# Patient Record
Sex: FEMALE | Race: WHITE | NOT HISPANIC OR LATINO | Employment: OTHER | URBAN - METROPOLITAN AREA
[De-identification: names, ages, dates, MRNs, and addresses within clinical notes are randomized per-mention and may not be internally consistent; named-entity substitution may affect disease eponyms.]

---

## 2023-11-24 ENCOUNTER — APPOINTMENT (OUTPATIENT)
Dept: RADIOLOGY | Facility: IMAGING CENTER | Age: 73
End: 2023-11-24
Attending: PHYSICIAN ASSISTANT
Payer: COMMERCIAL

## 2023-11-24 ENCOUNTER — OFFICE VISIT (OUTPATIENT)
Dept: URGENT CARE | Facility: CLINIC | Age: 73
End: 2023-11-24

## 2023-11-24 VITALS
BODY MASS INDEX: 41.54 KG/M2 | WEIGHT: 258.5 LBS | DIASTOLIC BLOOD PRESSURE: 80 MMHG | SYSTOLIC BLOOD PRESSURE: 128 MMHG | HEART RATE: 79 BPM | TEMPERATURE: 96.9 F | HEIGHT: 66 IN | OXYGEN SATURATION: 100 % | RESPIRATION RATE: 14 BRPM

## 2023-11-24 DIAGNOSIS — M79.644 PAIN OF FINGER OF RIGHT HAND: ICD-10-CM

## 2023-11-24 DIAGNOSIS — M19.041 PRIMARY OSTEOARTHRITIS OF RIGHT HAND: ICD-10-CM

## 2023-11-24 PROCEDURE — 3074F SYST BP LT 130 MM HG: CPT | Performed by: PHYSICIAN ASSISTANT

## 2023-11-24 PROCEDURE — 3079F DIAST BP 80-89 MM HG: CPT | Performed by: PHYSICIAN ASSISTANT

## 2023-11-24 PROCEDURE — 99203 OFFICE O/P NEW LOW 30 MIN: CPT | Performed by: PHYSICIAN ASSISTANT

## 2023-11-24 PROCEDURE — 73140 X-RAY EXAM OF FINGER(S): CPT | Mod: TC,RT | Performed by: PHYSICIAN ASSISTANT

## 2023-11-24 RX ORDER — GABAPENTIN 100 MG/1
100 CAPSULE ORAL
COMMUNITY
End: 2023-12-12

## 2023-11-24 RX ORDER — ROPINIROLE 1 MG/1
1 TABLET, FILM COATED ORAL 3 TIMES DAILY
COMMUNITY
End: 2023-12-12

## 2023-11-24 RX ORDER — ROSUVASTATIN CALCIUM 10 MG/1
10 TABLET, COATED ORAL EVERY EVENING
COMMUNITY
End: 2024-01-15 | Stop reason: SDUPTHER

## 2023-11-24 RX ORDER — FLUOXETINE 10 MG/1
10 CAPSULE ORAL DAILY
COMMUNITY

## 2023-11-24 RX ORDER — OMEPRAZOLE 20 MG/1
20 CAPSULE, DELAYED RELEASE ORAL DAILY
COMMUNITY
End: 2024-01-15 | Stop reason: SDUPTHER

## 2023-11-24 RX ORDER — LEVOTHYROXINE SODIUM 88 UG/1
88 TABLET ORAL
COMMUNITY
End: 2024-01-15 | Stop reason: SDUPTHER

## 2023-11-24 NOTE — PROGRESS NOTES
"Subjective:   Lisa Ortiz is a 73 y.o. female who presents for Hand Injury (X 10 days, right hand 5th finger injury, possible broken not improving )  Is a very pleasant 73-year-old female who presents with chief complaint of right pinky pain after an injury involving her [place].  Her hand got stuck in his collar when he yanked.  She has noted persistent throbbing and decreased range of motion prompting evaluation today.  She reports she tried splinting her finger with an emery board.        Medications:  FLUoxetine Caps  gabapentin Caps  levothyroxine Tabs  LORazepam 0.2 mg/mL  omeprazole  ROPINIRole Tabs  rosuvastatin Tabs    Allergies:             Patient has no allergy information on record.    Surgical History:       No past surgical history on file.    Past Social Hx:  Lisa Ortiz  reports that she has never smoked. She has never used smokeless tobacco. She reports that she does not currently use alcohol. She reports that she does not use drugs.     Past Family Hx:   Lisa Ortiz family history is not on file.       Problem list, medications, and allergies reviewed by myself today in Epic.     Objective:     /80   Pulse 79   Temp 36.1 °C (96.9 °F) (Temporal)   Resp 14   Ht 1.676 m (5' 6\")   Wt 117 kg (258 lb 8 oz)   SpO2 100%   BMI 41.72 kg/m²     Physical Exam  Vitals and nursing note reviewed.   Constitutional:       General: She is not in acute distress.     Appearance: She is well-developed. She is not ill-appearing, toxic-appearing or diaphoretic.   HENT:      Head: Normocephalic.   Eyes:      Pupils: Pupils are equal, round, and reactive to light.   Cardiovascular:      Rate and Rhythm: Normal rate.   Pulmonary:      Effort: Pulmonary effort is normal.   Musculoskeletal:         General: Swelling and tenderness present.      Comments: Osteoarthritic changes are noted to the hands bilaterally.  There is trace tenderness at the PIP and DIP.  There is slight deformity noted.  " Decreased flexion at the DIP is noted.  Distal neurovascular intact.  No significant soft tissue swelling or bruising.   Skin:     General: Skin is warm and dry.      Findings: No erythema or rash.   Neurological:      Mental Status: She is alert and oriented to person, place, and time.   Psychiatric:         Behavior: Behavior is cooperative.         RADIOLOGY RESULTS   DX-FINGER(S) 2+ RIGHT    Result Date: 11/24/2023 11/24/2023 12:27 PM HISTORY/REASON FOR EXAM:  Pain/Deformity Following Trauma; 5th digit. . TECHNIQUE/EXAM DESCRIPTION AND NUMBER OF VIEWS:  3 views of the RIGHT fingers. COMPARISON: None FINDINGS: There is no fracture or dislocation. The visualized osseous structures are in anatomic alignment. There are degenerative changes throughout the interphalangeal joints with joint space narrowing and marginal osteophytes. Bone mineralization is decreased..     1.  No acute osseous abnormality. 2.  Advanced osteoarthritis of the interphalangeal joints.           Assessment/Plan:     Diagnosis and Associated Orders:     1. Pain of finger of right hand  - DX-FINGER(S) 2+ RIGHT  - Referral to Orthopedics    2. Primary osteoarthritis of right hand  - Referral to Orthopedics    Other orders  - LORazepam 0.2 mg/mL (ATIVAN); Infuse 0.5 mg into a venous catheter every 3 hours as needed.  - FLUoxetine (PROZAC) 10 MG Cap; Take 10 mg by mouth every day.  - gabapentin (NEURONTIN) 100 MG Cap; Take 100 mg by mouth 3 times a day.  - levothyroxine (SYNTHROID) 88 MCG Tab; Take 88 mcg by mouth every morning on an empty stomach.  - omeprazole (PRILOSEC) 20 MG delayed-release capsule; Take 20 mg by mouth every day.  - ROPINIRole (REQUIP) 1 MG Tab; Take 1 mg by mouth 3 times a day.  - rosuvastatin (CRESTOR) 10 MG Tab; Take 10 mg by mouth every evening.        Comments/MDM:  No radiographic evidence of acute injury or fracture.  Significant osteoarthritic changes are noted.  Patient is still having a fair amount of pain.  Will  place in finger immobilizer.  Referral placed orthopedics for further evaluation of ongoing osteoarthritic changes in the hands causing pain.  Recommend NSAIDs/Tylenol as needed for pain, ice.  No indication for higher level of care today.  I personally reviewed prior external notes and test results pertinent to today's visit. Supportive care, natural history, differential diagnoses, and indications for immediate follow-up discussed. Return to clinic or go to ED if symptoms worsen or persist.  Red flag symptoms discussed.  Patient/Parent/Guardian voices understanding. Follow-up with your primary care provider in 3-5 days.  All side effects of medication discussed including allergic response, GI upset, tendon injury, rash, sedation etc    Please note that this dictation was created using voice recognition software. I have made a reasonable attempt to correct obvious errors, but I expect that there are errors of grammar and possibly content that I did not discover before finalizing the note.    This note was electronically signed by Aparna Carmona PA-C

## 2023-12-01 ENCOUNTER — TELEPHONE (OUTPATIENT)
Dept: HEALTH INFORMATION MANAGEMENT | Facility: OTHER | Age: 73
End: 2023-12-01

## 2023-12-02 ENCOUNTER — OFFICE VISIT (OUTPATIENT)
Dept: URGENT CARE | Facility: CLINIC | Age: 73
End: 2023-12-02
Payer: MEDICARE

## 2023-12-02 VITALS
OXYGEN SATURATION: 100 % | RESPIRATION RATE: 16 BRPM | HEART RATE: 70 BPM | SYSTOLIC BLOOD PRESSURE: 108 MMHG | WEIGHT: 258 LBS | TEMPERATURE: 96.6 F | BODY MASS INDEX: 41.46 KG/M2 | DIASTOLIC BLOOD PRESSURE: 68 MMHG | HEIGHT: 66 IN

## 2023-12-02 DIAGNOSIS — T14.8XXA WOUND OF SKIN: ICD-10-CM

## 2023-12-02 PROCEDURE — 99213 OFFICE O/P EST LOW 20 MIN: CPT | Performed by: FAMILY MEDICINE

## 2023-12-02 PROCEDURE — 3078F DIAST BP <80 MM HG: CPT | Performed by: FAMILY MEDICINE

## 2023-12-02 PROCEDURE — 3074F SYST BP LT 130 MM HG: CPT | Performed by: FAMILY MEDICINE

## 2023-12-02 RX ORDER — CEPHALEXIN 500 MG/1
500 CAPSULE ORAL 3 TIMES DAILY
Qty: 9 CAPSULE | Refills: 0 | Status: SHIPPED | OUTPATIENT
Start: 2023-12-02 | End: 2023-12-05

## 2023-12-02 ASSESSMENT — ENCOUNTER SYMPTOMS: ROS SKIN COMMENTS: CUT FINGER

## 2023-12-02 NOTE — PROGRESS NOTES
"Subjective     Lisa Chand is a 73 y.o. female who presents with Hand Injury (Left hand injury 3rd finger cut)      - This is a very pleasant 73 y.o. who has come to the walk-in clinic today for wound of Lt middle finger. Cutting something yesterday and knife slipped. Worried about infection    Tdap 7/23      ALLERGIES:  Patient has no known allergies.     PMH:  History reviewed. No pertinent past medical history.     PSH:  History reviewed. No pertinent surgical history.    MEDS:    Current Outpatient Medications:     cephALEXin (KEFLEX) 500 MG Cap, Take 1 Capsule by mouth 3 times a day for 3 days., Disp: 9 Capsule, Rfl: 0    LORazepam 0.2 mg/mL (ATIVAN), Infuse 0.5 mg into a venous catheter every 3 hours as needed., Disp: , Rfl:     FLUoxetine (PROZAC) 10 MG Cap, Take 10 mg by mouth every day., Disp: , Rfl:     gabapentin (NEURONTIN) 100 MG Cap, Take 100 mg by mouth 3 times a day., Disp: , Rfl:     levothyroxine (SYNTHROID) 88 MCG Tab, Take 88 mcg by mouth every morning on an empty stomach., Disp: , Rfl:     omeprazole (PRILOSEC) 20 MG delayed-release capsule, Take 20 mg by mouth every day., Disp: , Rfl:     ROPINIRole (REQUIP) 1 MG Tab, Take 1 mg by mouth 3 times a day., Disp: , Rfl:     rosuvastatin (CRESTOR) 10 MG Tab, Take 10 mg by mouth every evening., Disp: , Rfl:     ** I have documented what I find to be significant in regards to past medical, social, family and surgical history  in my HPI or under PMH/PSH/FH review section, otherwise it is noncontributory **         HPI    Review of Systems   Skin:         Cut finger   All other systems reviewed and are negative.             Objective     /68   Pulse 70   Temp 35.9 °C (96.6 °F) (Temporal)   Resp 16   Ht 1.676 m (5' 6\")   Wt 117 kg (258 lb)   SpO2 100%   BMI 41.64 kg/m²      Physical Exam  Vitals and nursing note reviewed.   Constitutional:       General: She is not in acute distress.     Appearance: Normal appearance. She is " well-developed.   HENT:      Head: Normocephalic.   Pulmonary:      Effort: Pulmonary effort is normal. No respiratory distress.   Musculoskeletal:        Hands:       Comments: Lt middle finger: ~8mm lac distal aspect, well approximated, some distal nail plate involved.    Neurological:      Mental Status: She is alert.      Motor: No abnormal muscle tone.   Psychiatric:         Mood and Affect: Mood normal.         Behavior: Behavior normal.                             Assessment & Plan     1. Wound of skin  cephALEXin (KEFLEX) 500 MG Cap          - Dx, plan & d/c instructions discussed   - keep wound clean/dry  - OTC polysporin      Follow up with your regular primary care providers office within a week to keep them updated and informed of this visit and for regular routine health maintenance check-ups. ER if not improving in 2-3 days or if feeling/getting worse.     Patient left in stable condition     Pertinent prior lab work and/or imaging studies in Epic have been reviewed by me today on day of this visit and taken into account for my treatment and plan today    Pertinent PMH/PSH and/or chronic conditions and medications if any were reviewed today and taken into account for my treatment and plan today    Pertinent prior office visit notes in [facility] have been reviewed by me today on day of this visit.    Please note that this dictation may have been created using voice recognition software, if so I have made every reasonable attempt to correct obvious errors, but I expect that there are errors of grammar and possibly content that I did not discover before finalizing the note.

## 2023-12-12 ENCOUNTER — OFFICE VISIT (OUTPATIENT)
Dept: MEDICAL GROUP | Facility: PHYSICIAN GROUP | Age: 73
End: 2023-12-12
Payer: MEDICARE

## 2023-12-12 VITALS
DIASTOLIC BLOOD PRESSURE: 72 MMHG | HEIGHT: 66 IN | OXYGEN SATURATION: 96 % | HEART RATE: 80 BPM | RESPIRATION RATE: 18 BRPM | SYSTOLIC BLOOD PRESSURE: 128 MMHG | TEMPERATURE: 98.9 F | BODY MASS INDEX: 42.14 KG/M2 | WEIGHT: 262.2 LBS

## 2023-12-12 DIAGNOSIS — E66.01 MORBID OBESITY WITH BMI OF 40.0-44.9, ADULT (HCC): ICD-10-CM

## 2023-12-12 DIAGNOSIS — Z11.4 SCREENING FOR HIV (HUMAN IMMUNODEFICIENCY VIRUS): ICD-10-CM

## 2023-12-12 DIAGNOSIS — Z78.0 POSTMENOPAUSE: ICD-10-CM

## 2023-12-12 DIAGNOSIS — G25.81 RESTLESS LEGS SYNDROME: ICD-10-CM

## 2023-12-12 DIAGNOSIS — G35 MULTIPLE SCLEROSIS (HCC): ICD-10-CM

## 2023-12-12 DIAGNOSIS — G25.81 RESTLESS LEG SYNDROME: ICD-10-CM

## 2023-12-12 PROBLEM — G37.3 TRANSVERSE MYELITIS (HCC): Chronic | Status: ACTIVE | Noted: 2021-04-23

## 2023-12-12 PROBLEM — I07.1 TRICUSPID VALVE REGURGITATION: Chronic | Status: ACTIVE | Noted: 2023-05-26

## 2023-12-12 PROBLEM — E78.5 DYSLIPIDEMIA: Chronic | Status: ACTIVE | Noted: 2019-04-29

## 2023-12-12 PROBLEM — M47.818 ARTHRITIS OF LEFT SACROILIAC JOINT: Chronic | Status: ACTIVE | Noted: 2022-03-08

## 2023-12-12 PROBLEM — D80.1 HYPOGAMMAGLOBULINEMIA (HCC): Chronic | Status: ACTIVE | Noted: 2023-06-05

## 2023-12-12 PROBLEM — Z86.010 HX OF COLONIC POLYP: Chronic | Status: ACTIVE | Noted: 2019-05-30

## 2023-12-12 PROBLEM — Z85.42 HISTORY OF MALIGNANT NEOPLASM OF ENDOMETRIUM: Chronic | Status: ACTIVE | Noted: 2023-07-28

## 2023-12-12 PROBLEM — Z86.0100 HX OF COLONIC POLYP: Chronic | Status: ACTIVE | Noted: 2019-05-30

## 2023-12-12 PROBLEM — M46.1 ARTHRITIS OF LEFT SACROILIAC JOINT (HCC): Chronic | Status: ACTIVE | Noted: 2022-03-08

## 2023-12-12 PROBLEM — M85.80 OSTEOPENIA WITH HIGH RISK OF FRACTURE: Status: ACTIVE | Noted: 2018-12-05

## 2023-12-12 PROBLEM — R73.03 PREDIABETES: Chronic | Status: ACTIVE | Noted: 2021-03-26

## 2023-12-12 PROCEDURE — 99204 OFFICE O/P NEW MOD 45 MIN: CPT | Performed by: STUDENT IN AN ORGANIZED HEALTH CARE EDUCATION/TRAINING PROGRAM

## 2023-12-12 PROCEDURE — 3074F SYST BP LT 130 MM HG: CPT | Performed by: STUDENT IN AN ORGANIZED HEALTH CARE EDUCATION/TRAINING PROGRAM

## 2023-12-12 PROCEDURE — 3078F DIAST BP <80 MM HG: CPT | Performed by: STUDENT IN AN ORGANIZED HEALTH CARE EDUCATION/TRAINING PROGRAM

## 2023-12-12 RX ORDER — ROPINIROLE 1 MG/1
1 TABLET, FILM COATED ORAL 4 TIMES DAILY
COMMUNITY
Start: 2023-12-12 | End: 2024-01-15 | Stop reason: SDUPTHER

## 2023-12-12 RX ORDER — LOSARTAN POTASSIUM AND HYDROCHLOROTHIAZIDE 12.5; 5 MG/1; MG/1
2 TABLET ORAL DAILY
COMMUNITY
Start: 2023-09-20 | End: 2024-01-15 | Stop reason: SDUPTHER

## 2023-12-12 RX ORDER — GABAPENTIN 100 MG/1
300 CAPSULE ORAL
COMMUNITY
Start: 2023-12-12 | End: 2024-01-15 | Stop reason: SDUPTHER

## 2023-12-12 ASSESSMENT — ENCOUNTER SYMPTOMS
BLOOD IN STOOL: 0
DIARRHEA: 0
FLANK PAIN: 0
WEIGHT LOSS: 0
WEAKNESS: 0
MYALGIAS: 0
VOMITING: 0
HALLUCINATIONS: 0
NECK PAIN: 0
SENSORY CHANGE: 0
SEIZURES: 0
SPEECH CHANGE: 0
EYE DISCHARGE: 0
BLURRED VISION: 0
LOSS OF CONSCIOUSNESS: 0
FALLS: 0
PND: 0
FEVER: 0
CONSTIPATION: 0
SHORTNESS OF BREATH: 0
CLAUDICATION: 0
FOCAL WEAKNESS: 0
COUGH: 0
DIZZINESS: 0
DOUBLE VISION: 0
TINGLING: 0
BRUISES/BLEEDS EASILY: 0
ABDOMINAL PAIN: 0
DIAPHORESIS: 0
PALPITATIONS: 0
CHILLS: 0
POLYDIPSIA: 0
WHEEZING: 0
DEPRESSION: 0
SPUTUM PRODUCTION: 0
EYE PAIN: 0
NAUSEA: 0
ORTHOPNEA: 0
HEARTBURN: 0
HEMOPTYSIS: 0
HEADACHES: 0

## 2023-12-12 ASSESSMENT — PATIENT HEALTH QUESTIONNAIRE - PHQ9: CLINICAL INTERPRETATION OF PHQ2 SCORE: 0

## 2023-12-12 ASSESSMENT — LIFESTYLE VARIABLES: SUBSTANCE_ABUSE: 0

## 2023-12-12 NOTE — PATIENT INSTRUCTIONS
-Increase gabapentin to 200 mg, and then 300 mg nightly for rest less leg syndrome   -start taichi again for osteoarthritis  -Do labs at least 1 week before next visit.

## 2023-12-12 NOTE — LETTER
[facility]  Xander Maldonado M.D.  [address]  [place] [address]  Fax: [phone]   Authorization for Release/Disclosure of   Protected Health Information   Name: CORDELL RODRIGES : 1950 SSN: [id number]   Address: [address] [place] [address] [place] [address] Phone:    [phone] (home)    I authorize the entity listed below to release/disclose the PHI below to:   [facility]/Xander Maldonado M.D. and Xander Maldonado M.D.   Provider or Entity Name:     Address   City, State, Zip   Phone:      Fax:     Reason for request: continuity of care   Information to be released:    [  ] LAST COLONOSCOPY,  including any PATH REPORT and follow-up  [  ] LAST FIT/COLOGUARD RESULT [  ] LAST DEXA  [  ] LAST MAMMOGRAM  [  ] LAST PAP  [  ] LAST LABS [  ] RETINA EXAM REPORT  [  ] IMMUNIZATION RECORDS  [  ] Release all info      [  ] Check here and initial the line next to each item to release ALL health information INCLUDING  _____ Care and treatment for drug and / or alcohol abuse  _____ HIV testing, infection status, or AIDS  _____ Genetic Testing    DATES OF SERVICE OR TIME PERIOD TO BE DISCLOSED: _____________  I understand and acknowledge that:  * This Authorization may be revoked at any time by you in writing, except if your health information has already been used or disclosed.  * Your health information that will be used or disclosed as a result of you signing this authorization could be re-disclosed by the recipient. If this occurs, your re-disclosed health information may no longer be protected by State or Federal laws.  * You may refuse to sign this Authorization. Your refusal will not affect your ability to obtain treatment.  * This Authorization becomes effective upon signing and will  on (date) __________.      If no date is indicated, this Authorization will  one (1) year from the signature date.    Name: Cordell Rodriges  Signature: Date:   2023     PLEASE FAX REQUESTED RECORDS BACK  TO: [phone]

## 2023-12-12 NOTE — ASSESSMENT & PLAN NOTE
Chronic, stable, in remission.  Per patient patient is on rituximab once a year.  Patient needs to establish with neurology in [place]

## 2023-12-12 NOTE — PROGRESS NOTES
"CC:  Diagnoses of Morbid obesity with BMI of 40.0-44.9, adult (HCC), Restless leg syndrome, Multiple sclerosis (HCC), Postmenopause, Screening for HIV (human immunodeficiency virus), and Restless legs syndrome were pertinent to this visit.    HISTORY OF THE PRESENT ILLNESS: Patient is a 73 y.o. female. This pleasant patient is here today to establish care and discuss problems listed below. Recently Moved from [place]   Prediabetes, igg deficiency, ckd stage 3a, osteoarthritis  Last A1c 6.2 in August 2023  Joint pain but esr,crp,rf,ccp negative.  Has osteoarthritis, tried taichi in the past and helped.    Denies acute issues. Established care with me today.    Problem   Morbid Obesity With Bmi of 40.0-44.9, Adult (Hcc)   History of Malignant Neoplasm of Endometrium   Hypogammaglobulinemia (Hcc)   Tricuspid Valve Regurgitation    Formatting of this note might be different from the original. Mild 2016     Arthritis of Left Sacroiliac Joint   Transverse Myelitis (Hcc)   Prediabetes   Hx of Colonic Polyp    Formatting of this note might be different from the original. Colonoscopy 5/30/2019 - 9mm tubular adenoma at 20cm Repeat colonoscopy in 5 years, 2024     Dyslipidemia   Osteopenia With High Risk of Fracture    Formatting of this note might be different from the original. DXA \"Key Findings\" from  12/1 /2018 noted as:  Lowest T-score  - 2.0  Hip . Lowest Z-Score  - 1.0 .   FRAX scores (Hip 10 yr Risk  3.6 %, Other Major 10 yr Risk  16.8 %).  Message sent to [facility]  to arrange an appointment with [facility] Care Manager to discuss DXA results & treatment. History chronic use of steroids     Hx of Breast Cancer   Atherosclerosis of Aorta (Hcc)    Formatting of this note might be different from the original. Noted on XR CHEST, 1 VIEW on 09/20/2014     Restless Legs Syndrome    Chronic, decompensated, patient states that gabapentin 100 mg does not work for her so I am increasing " gabapentin up to 300 mg nightly for restless leg syndrome  -Continue ropinirole 1 mg 4 times a day      Right Trigeminal Neuralgia   Gerd (Gastroesophageal Reflux Disease)   Htn (Hypertension)   Hypothyroidism   Intraductal Papilloma of Right Breast    Formatting of this note might be different from the original. Removed Dec 2011 right breast     Multiple Sclerosis (Hcc)     Diagnosed 1990  Chronic, stable, in remission.  Per patient patient is on rituximab once a year.  Patient needs to establish with neurology in [place]         Pertinent [facility] (and specialists following):  -[place] encephalitis  -transverse myelitis  -breast cancer s/p lumpectomy and radiation in 2013.  No one in family has breast cancer.  -history of endometrial cancer s/p hysterectomy and bilateral oopherectomy.  -multiple sclerosis on rituxan once  a year  -HTN  -hypothyroidism  Quit smoking 46 years ago, rarely alcohol, no drugs.    Health Maintenance: Completed  Last colonsocopy was 2019, had one benign polyp and no need for surveilance.  Worked as a  for her 's company. Has been  for 53 years. Has 2 kids. Has grandchildren  Has a daughter in [place], master of [employer]; she has two kids. Her  is squeeze in [facility].  Does [email or web address].    No past medical history on file.    Current Outpatient Medications   Medication Sig Dispense Refill    gabapentin (NEURONTIN) 100 MG Cap Take 3 Capsules by mouth every day.      ROPINIRole (REQUIP) 1 MG Tab Take 1 Tablet by mouth 4 times a day.      losartan-hydrochlorothiazide (HYZAAR) 50-12.5 MG per tablet Take 2 Tablets by mouth every day.      FLUoxetine (PROZAC) 10 MG Cap Take 10 mg by mouth every day.      levothyroxine (SYNTHROID) 88 MCG Tab Take 88 mcg by mouth every morning on an empty stomach.      omeprazole (PRILOSEC) 20 MG delayed-release capsule Take 20 mg by mouth every day.      rosuvastatin (CRESTOR) 10 MG Tab Take 10 mg by mouth every evening.       No current  facility-administered medications for this visit.       Allergies as of 12/12/2023 - Reviewed 12/12/2023   Allergen Reaction Noted    Shellfish allergy Rash 06/13/2019    Lisinopril  06/19/2019       Social History     Socioeconomic History    Marital status:      Spouse name: Not on file    Number of children: Not on file    Years of education: Not on file    Highest education level: Not on file   Occupational History    Not on file   Tobacco Use    Smoking status: Never    Smokeless tobacco: Never   Vaping Use    Vaping Use: Never used   Substance and Sexual Activity    Alcohol use: Not Currently     Comment: occ    Drug use: Never    Sexual activity: Not on file   Other Topics Concern    Not on file   Social History Narrative    Not on file     Social Determinants of Health     Financial Resource Strain: Not on file   Food Insecurity: Not on file   Transportation Needs: Not on file   Physical Activity: Not on file   Stress: Not on file   Social Connections: Not on file   Intimate Partner Violence: Not on file   Housing Stability: Not on file       No family history on file.  Denies pertinent family history    No past surgical history on file.  Had lumpectomy    ROS:   Review of Systems   Constitutional:  Negative for chills, diaphoresis, fever and weight loss.   HENT:  Positive for hearing loss. Negative for ear discharge, ear pain and tinnitus.    Eyes:  Negative for blurred vision, double vision, pain and discharge.   Respiratory:  Negative for cough, hemoptysis, sputum production, shortness of breath and wheezing.    Cardiovascular:  Negative for chest pain, palpitations, orthopnea, claudication, leg swelling and PND.   Gastrointestinal:  Negative for abdominal pain, blood in stool, constipation, diarrhea, heartburn, melena, nausea and vomiting.   Genitourinary:  Negative for dysuria, flank pain, hematuria and urgency.   Musculoskeletal:  Positive for joint pain. Negative for falls, myalgias and neck  "pain.   Skin:  Negative for itching and rash.   Neurological:  Negative for dizziness, tingling, sensory change, speech change, focal weakness, seizures, loss of consciousness, weakness and headaches.   Endo/Heme/Allergies:  Negative for polydipsia. Does not bruise/bleed easily.   Psychiatric/Behavioral:  Negative for depression, hallucinations, substance abuse and suicidal ideas.        /72 (BP Location: Left arm, Patient Position: Sitting, BP Cuff Size: Adult)   Pulse 80   Temp 37.2 °C (98.9 °F) (Temporal)   Resp 18   Ht 1.676 m (5' 6\")   Wt 119 kg (262 lb 3.2 oz)   SpO2 96%   BMI 42.32 kg/m² Body mass index is 42.32 kg/m².    Physical Exam  Vitals reviewed.   Constitutional:       General: She is not in acute distress.     Appearance: She is not ill-appearing or diaphoretic.   HENT:      Head: Normocephalic and atraumatic.      Right Ear: External ear normal.      Left Ear: External ear normal.      Nose: No rhinorrhea.      Mouth/Throat:      Pharynx: No oropharyngeal exudate.   Eyes:      General: No scleral icterus.        Right eye: No discharge.         Left eye: No discharge.      Extraocular Movements: Extraocular movements intact.   Cardiovascular:      Rate and Rhythm: Normal rate and regular rhythm.      Heart sounds: Normal heart sounds. No murmur heard.  Pulmonary:      Effort: Pulmonary effort is normal. No respiratory distress.      Breath sounds: Normal breath sounds. No stridor. No wheezing, rhonchi or rales.   Abdominal:      General: There is no distension.      Palpations: Abdomen is soft. There is no mass.      Tenderness: There is no abdominal tenderness. There is no guarding or rebound.   Musculoskeletal:         General: No tenderness. Normal range of motion.      Cervical back: No rigidity or tenderness.      Right lower leg: No edema.      Left lower leg: No edema.   Lymphadenopathy:      Cervical: No cervical adenopathy.   Skin:     Capillary Refill: Capillary refill takes " less than 2 seconds.      Findings: No bruising, erythema, lesion or rash.   Neurological:      General: No focal deficit present.      Mental Status: She is alert and oriented to person, place, and time. Mental status is at baseline.      Cranial Nerves: No cranial nerve deficit.      Sensory: No sensory deficit.      Motor: No weakness.      Deep Tendon Reflexes: Reflexes normal.   Psychiatric:         Mood and Affect: Mood normal.         Behavior: Behavior normal.         Thought Content: Thought content normal.         Judgment: Judgment normal.             Note: I have reviewed all pertinent labs and diagnostic tests associated with this visit with specific comments listed under the assessment and plan below    Assessment and Plan  73 y.o. female with the following -    HCC Gap Form    Diagnosis: E66.01 - Morbid (severe) obesity due to excess calories (HCC)  Z68.41 - Body mass index (BMI) 40.0-44.9, adult (HCC)  The current BMI is 42.32 kg/m2 as of 12/12/23 15:22 PST  Assessment and plan: Chronic, stable. Encouraged healthy diet and physical activity changes with a goal of weight loss. Follow up at least annually. Declined nutrition referral.  Last edited 12/12/23 15:23 PST by Xander Maldonado M.D.         1. Morbid obesity with BMI of 40.0-44.9, adult (HCC)  Chronic, decompensated. Patient states that she likes to eat.  Declined referral to nutrition  - Patient identified as having weight management issue.  Appropriate orders and counseling given.    2. Restless leg syndrome  Chronic, decompensated, patient states that gabapentin 100 mg does not work for her so I am increasing gabapentin up to 300 mg nightly for restless leg syndrome  -Continue ropinirole 1 mg 4 times a day   - IRON/TOTAL IRON BIND; Future  - FERRITIN; Future    3. Multiple sclerosis (HCC)  Chronic, stable, in remission.  Per patient patient is on rituximab once a year.  Patient needs to establish with neurology in [place]  - Referral to  Neurology    4. Postmenopause  Chronic, stable  - DS-BONE DENSITY STUDY (DEXA); Future    5. Screening for HIV (human immunodeficiency virus)  - HIV AG/AB COMBO ASSAY SCREENING; Future .    I spent a total of 48 minutes with record review, exam, communication with the patient, communication with other providers, and documentation of this encounter.    Return in about 1 month (around 1/12/2024).    Please note that this dictation was created using voice recognition software. I have made every reasonable attempt to correct obvious errors, but I expect that there are errors of grammar and possibly content that I did not discover before finalizing the note.

## 2023-12-12 NOTE — ASSESSMENT & PLAN NOTE
Chronic, decompensated, patient states that gabapentin 100 mg does not work for her so I am increasing gabapentin up to 300 mg nightly for restless leg syndrome  -Continue ropinirole 1 mg 4 times a day

## 2023-12-15 ENCOUNTER — TELEPHONE (OUTPATIENT)
Dept: HEALTH INFORMATION MANAGEMENT | Facility: OTHER | Age: 73
End: 2023-12-15
Payer: MEDICARE

## 2023-12-19 PROBLEM — F13.20 SEDATIVE DEPENDENCE WITH CURRENT USE (HCC): Status: ACTIVE | Noted: 2023-12-19

## 2024-01-15 ENCOUNTER — OFFICE VISIT (OUTPATIENT)
Dept: MEDICAL GROUP | Facility: PHYSICIAN GROUP | Age: 74
End: 2024-01-15
Payer: MEDICARE

## 2024-01-15 VITALS
HEART RATE: 65 BPM | WEIGHT: 261 LBS | HEIGHT: 66 IN | TEMPERATURE: 97.4 F | DIASTOLIC BLOOD PRESSURE: 62 MMHG | BODY MASS INDEX: 41.95 KG/M2 | OXYGEN SATURATION: 100 % | SYSTOLIC BLOOD PRESSURE: 114 MMHG

## 2024-01-15 DIAGNOSIS — D80.1 HYPOGAMMAGLOBULINEMIA (HCC): Chronic | ICD-10-CM

## 2024-01-15 DIAGNOSIS — I10 HYPERTENSION, UNSPECIFIED TYPE: Chronic | ICD-10-CM

## 2024-01-15 DIAGNOSIS — N18.31 STAGE 3A CHRONIC KIDNEY DISEASE (CKD): Chronic | ICD-10-CM

## 2024-01-15 DIAGNOSIS — I70.0 ATHEROSCLEROSIS OF AORTA (HCC): Chronic | ICD-10-CM

## 2024-01-15 DIAGNOSIS — E03.9 HYPOTHYROIDISM, UNSPECIFIED TYPE: Chronic | ICD-10-CM

## 2024-01-15 DIAGNOSIS — G89.29 CHRONIC PAIN OF LEFT KNEE: Chronic | ICD-10-CM

## 2024-01-15 DIAGNOSIS — G35 MULTIPLE SCLEROSIS (HCC): Chronic | ICD-10-CM

## 2024-01-15 DIAGNOSIS — M25.562 CHRONIC PAIN OF LEFT KNEE: Chronic | ICD-10-CM

## 2024-01-15 DIAGNOSIS — M54.59 OTHER LOW BACK PAIN: Chronic | ICD-10-CM

## 2024-01-15 DIAGNOSIS — R73.03 PREDIABETES: Chronic | ICD-10-CM

## 2024-01-15 DIAGNOSIS — K44.9 HIATAL HERNIA: Chronic | ICD-10-CM

## 2024-01-15 DIAGNOSIS — E66.01 MORBID (SEVERE) OBESITY DUE TO EXCESS CALORIES (HCC): Chronic | ICD-10-CM

## 2024-01-15 DIAGNOSIS — E78.5 DYSLIPIDEMIA: Chronic | ICD-10-CM

## 2024-01-15 DIAGNOSIS — E66.01 MORBID OBESITY WITH BMI OF 40.0-44.9, ADULT (HCC): Chronic | ICD-10-CM

## 2024-01-15 PROBLEM — K76.0 FATTY LIVER: Status: ACTIVE | Noted: 2024-01-15

## 2024-01-15 PROBLEM — I07.1 TRICUSPID VALVE REGURGITATION: Status: ACTIVE | Noted: 2023-05-26

## 2024-01-15 PROBLEM — K76.0 FATTY LIVER: Status: RESOLVED | Noted: 2024-01-15 | Resolved: 2024-01-15

## 2024-01-15 PROCEDURE — 3074F SYST BP LT 130 MM HG: CPT | Performed by: STUDENT IN AN ORGANIZED HEALTH CARE EDUCATION/TRAINING PROGRAM

## 2024-01-15 PROCEDURE — 3078F DIAST BP <80 MM HG: CPT | Performed by: STUDENT IN AN ORGANIZED HEALTH CARE EDUCATION/TRAINING PROGRAM

## 2024-01-15 PROCEDURE — 99215 OFFICE O/P EST HI 40 MIN: CPT | Performed by: STUDENT IN AN ORGANIZED HEALTH CARE EDUCATION/TRAINING PROGRAM

## 2024-01-15 RX ORDER — GABAPENTIN 100 MG/1
300 CAPSULE ORAL
Qty: 300 CAPSULE | Refills: 2 | Status: SHIPPED | OUTPATIENT
Start: 2024-01-15

## 2024-01-15 RX ORDER — ROPINIROLE 1 MG/1
1 TABLET, FILM COATED ORAL 4 TIMES DAILY
Qty: 400 TABLET | Refills: 2 | Status: SHIPPED | OUTPATIENT
Start: 2024-01-15

## 2024-01-15 RX ORDER — OMEPRAZOLE 20 MG/1
20 CAPSULE, DELAYED RELEASE ORAL DAILY
Qty: 100 CAPSULE | Refills: 2 | Status: SHIPPED | OUTPATIENT
Start: 2024-01-15

## 2024-01-15 RX ORDER — LOSARTAN POTASSIUM AND HYDROCHLOROTHIAZIDE 12.5; 5 MG/1; MG/1
2 TABLET ORAL DAILY
Qty: 200 TABLET | Refills: 2 | Status: SHIPPED | OUTPATIENT
Start: 2024-01-15

## 2024-01-15 RX ORDER — ROSUVASTATIN CALCIUM 10 MG/1
10 TABLET, COATED ORAL EVERY EVENING
Qty: 100 TABLET | Refills: 2 | Status: SHIPPED | OUTPATIENT
Start: 2024-01-15

## 2024-01-15 RX ORDER — LEVOTHYROXINE SODIUM 88 UG/1
88 TABLET ORAL
Qty: 100 TABLET | Refills: 2 | Status: SHIPPED | OUTPATIENT
Start: 2024-01-15

## 2024-01-15 ASSESSMENT — PATIENT HEALTH QUESTIONNAIRE - PHQ9
SUM OF ALL RESPONSES TO PHQ QUESTIONS 1-9: 2
CLINICAL INTERPRETATION OF PHQ2 SCORE: 1
5. POOR APPETITE OR OVEREATING: 1 - SEVERAL DAYS

## 2024-01-15 ASSESSMENT — ENCOUNTER SYMPTOMS
NAUSEA: 0
NECK PAIN: 0
SPEECH CHANGE: 0
DIZZINESS: 0
SEIZURES: 0
FALLS: 0
HALLUCINATIONS: 0
FOCAL WEAKNESS: 0
PALPITATIONS: 0
EYE PAIN: 0
EYE DISCHARGE: 0
SPUTUM PRODUCTION: 0
BLOOD IN STOOL: 0
ABDOMINAL PAIN: 0
BLURRED VISION: 0
ORTHOPNEA: 0
WHEEZING: 0
WEIGHT LOSS: 0
MYALGIAS: 0
PND: 0
VOMITING: 0
SHORTNESS OF BREATH: 0
CONSTIPATION: 0
SENSORY CHANGE: 0
TINGLING: 0
COUGH: 0
HEADACHES: 0
POLYDIPSIA: 0
DOUBLE VISION: 0
CHILLS: 0
BRUISES/BLEEDS EASILY: 0
WEAKNESS: 0
DIAPHORESIS: 0
FLANK PAIN: 0
DIARRHEA: 0
CLAUDICATION: 0
HEARTBURN: 0
DEPRESSION: 0
LOSS OF CONSCIOUSNESS: 0
HEMOPTYSIS: 0
FEVER: 0

## 2024-01-15 ASSESSMENT — LIFESTYLE VARIABLES: SUBSTANCE_ABUSE: 0

## 2024-01-15 NOTE — PATIENT INSTRUCTIONS
-Do labs (fasting) 1-2 weeks before next visit.   -Start taking semaglutide (rybelsus) 3 mg once daily for 30 days; increase it 6 mg once daily if well tolerated.  -placed referral to nutritionist, orthopedic surgery, pain medicine

## 2024-01-15 NOTE — PROGRESS NOTES
CC:  Diagnoses of Dyslipidemia, Hypertension, unspecified type, Prediabetes, Morbid obesity with BMI of 40.0-44.9, adult (HCC), Chronic pain of left knee, Hiatal hernia, Morbid (severe) obesity due to excess calories (HCC), Body mass index (BMI) 40.0-44.9, adult (HCC), Atherosclerosis of aorta (HCC), Hypogammaglobulinemia (HCC), Multiple sclerosis (HCC), Stage 3a chronic kidney disease (CKD) (HCC), Other low back pain, and Hypothyroidism, unspecified type were pertinent to this visit.    HISTORY OF THE PRESENT ILLNESS: Patient is a 73 y.o. female. This pleasant patient is here today for follow up visit.  Has osteoarthritis, tried taichi in the past and helped but does not want to do taichi anymore    R lower Back pain has been going on 4-5 years. No trauma or fall recently or in the past. Has baseline urinary incontinence from multiple sclerosis.   Patient declined physical therapy because she states that it did not her for her in the past.    Problem   Hiatal Hernia   Stage 3a Chronic Kidney Disease (Ckd) (Hcc)   Fatty Liver   Other Low Back Pain   Tricuspid Valve Regurgitation    Formatting of this note might be different from the original. Mild 2016     Prediabetes   Multiple Sclerosis (Hcc)     Diagnosed 1990  Chronic, stable, in remission.  Per patient patient is on rituximab once a year.  Patient needs to establish with neurology in [place]     Right Trigeminal Neuralgia (Resolved)       Pertinent PMH (and specialists following):  Prediabetes, igg deficiency, ckd stage 3a, osteoarthritis  -[place] encephalitis  -transverse myelitis  -breast cancer s/p lumpectomy and radiation in 2013.  No one in family has breast cancer.  -history of endometrial cancer s/p hysterectomy and bilateral oopherectomy.  -multiple sclerosis on rituxan once  a year  -HTN  -hypothyroidism  Quit smoking 46 years ago, rarely alcohol, no drugs.    Health Maintenance: Completed  Last colonsocopy was 2019, had one benign polyp and no need  for [facility].  Worked as a  for her 's company. Has been  for 53 years. Has 2 kids. 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 Her  is squeeze in . Son is a .  Does crafts.    Past Medical History:   Diagnosis Date    Right trigeminal neuralgia 07/30/2013       Current Outpatient Medications   Medication Sig Dispense Refill    Semaglutide 3 MG Tab Take 1 Tablet by mouth every day. 100 Tablet 2    gabapentin (NEURONTIN) 100 MG Cap Take 3 Capsules by mouth at bedtime. 300 Capsule 2    ROPINIRole (REQUIP) 1 MG Tab Take 1 Tablet by mouth 4 times a day. 400 Tablet 2    omeprazole (PRILOSEC) 20 MG delayed-release capsule Take 1 Capsule by mouth every day. 100 Capsule 2    rosuvastatin (CRESTOR) 10 MG Tab Take 1 Tablet by mouth every evening. 100 Tablet 2    levothyroxine (SYNTHROID) 88 MCG Tab Take 1 Tablet by mouth every morning on an empty stomach. 100 Tablet 2    losartan-hydrochlorothiazide (HYZAAR) 50-12.5 MG per tablet Take 2 Tablets by mouth every day. 200 Tablet 2    FLUoxetine (PROZAC) 10 MG Cap Take 10 mg by mouth every day.       No current facility-administered medications for this visit.       Allergies as of 01/15/2024 - Reviewed 01/15/2024   Allergen Reaction Noted    Shellfish allergy Rash 06/13/2019    Lisinopril  06/19/2019       Social History     Socioeconomic History    Marital status:      Spouse name: Not on file    Number of children: Not on file    Years of education: Not on file    Highest education level: Not on file   Occupational History    Not on file   Tobacco Use    Smoking status: Never    Smokeless tobacco: Never   Vaping Use    Vaping Use: Never used   Substance and Sexual Activity    Alcohol use: Not Currently     Comment: occ    Drug use: Never    Sexual activity: Not on file   Other Topics Concern    Not on file   Social History Narrative    Not on file     Social Determinants of Health  "    Financial Resource Strain: Not on file   Food Insecurity: Not on file   Transportation Needs: Not on file   Physical Activity: Not on file   Stress: Not on file   Social Connections: Not on file   Intimate Partner Violence: Not on file   Housing Stability: Not on file       No family history on file.  Denies pertinent family history    No past surgical history on file.  Had lumpectomy    ROS:   Review of Systems   Constitutional:  Negative for chills, diaphoresis, fever and weight loss.   HENT:  Positive for hearing loss. Negative for ear discharge, ear pain and tinnitus.    Eyes:  Negative for blurred vision, double vision, pain and discharge.   Respiratory:  Negative for cough, hemoptysis, sputum production, shortness of breath and wheezing.    Cardiovascular:  Negative for chest pain, palpitations, orthopnea, claudication, leg swelling and PND.   Gastrointestinal:  Negative for abdominal pain, blood in stool, constipation, diarrhea, heartburn, melena, nausea and vomiting.   Genitourinary:  Negative for dysuria, flank pain, hematuria and urgency.   Musculoskeletal:  Positive for joint pain. Negative for falls, myalgias and neck pain.   Skin:  Negative for itching and rash.   Neurological:  Negative for dizziness, tingling, sensory change, speech change, focal weakness, seizures, loss of consciousness, weakness and headaches.   Endo/Heme/Allergies:  Negative for polydipsia. Does not bruise/bleed easily.   Psychiatric/Behavioral:  Negative for depression, hallucinations, substance abuse and suicidal ideas.        /62 (BP Location: Right arm, Patient Position: Sitting, BP Cuff Size: Large adult)   Pulse 65   Temp 36.3 °C (97.4 °F) (Temporal)   Ht 1.676 m (5' 6\")   Wt 118 kg (261 lb)   SpO2 100%   BMI 42.13 kg/m² Body mass index is 42.13 kg/m².    Physical Exam  Vitals reviewed.   Constitutional:       General: She is not in acute distress.     Appearance: She is not ill-appearing or diaphoretic. "   HENT:      Head: Normocephalic and atraumatic.      Right Ear: External ear normal.      Left Ear: External ear normal.      Nose: No rhinorrhea.      Mouth/Throat:      Pharynx: No oropharyngeal exudate.   Eyes:      General: No scleral icterus.        Right eye: No discharge.         Left eye: No discharge.      Extraocular Movements: Extraocular movements intact.   Cardiovascular:      Rate and Rhythm: Normal rate and regular rhythm.      Heart sounds: Normal heart sounds. No murmur heard.  Pulmonary:      Effort: Pulmonary effort is normal. No respiratory distress.      Breath sounds: Normal breath sounds. No stridor. No wheezing, rhonchi or rales.   Abdominal:      General: There is no distension.      Palpations: Abdomen is soft. There is no mass.      Tenderness: There is no abdominal tenderness. There is no guarding or rebound.   Musculoskeletal:         General: No tenderness. Normal range of motion.      Cervical back: No rigidity or tenderness.      Right lower leg: No edema.      Left lower leg: No edema.   Lymphadenopathy:      Cervical: No cervical adenopathy.   Skin:     Capillary Refill: Capillary refill takes less than 2 seconds.      Findings: No bruising, erythema, lesion or rash.   Neurological:      General: No focal deficit present.      Mental Status: She is alert and oriented to person, place, and time. Mental status is at baseline.      Cranial Nerves: No cranial nerve deficit.      Sensory: No sensory deficit.      Motor: No weakness.      Deep Tendon Reflexes: Reflexes normal.   Psychiatric:         Mood and Affect: Mood normal.         Behavior: Behavior normal.         Thought Content: Thought content normal.         Judgment: Judgment normal.             Note: I have reviewed all pertinent labs and diagnostic tests associated with this visit with specific comments listed under the assessment and plan below    Assessment and Plan  73 y.o. female with the following -    starting  semaglutide 3 mg daily for 30 days; increase it 6 mg once daily if well tolerated.  Explained to the patient risk of pancreatitis and GI side effects and patient expressed understanding and still proceed with starting semaglutide, patient states that she does not like to do subcu so she really prefers oral medicines so I am starting rybelsus  I also placed referral to nutritionist    I refilled patient's medications today    For chronic back pain I am referring to pain medicine for chronic left knee pain and referral to orthopedic surgery    [facility]    Diagnosis: E66.01 - Morbid (severe) obesity due to excess calories ([facility])  Z68.41 - Body mass index (BMI) 40.0-44.9, adult ([facility])  The current BMI is 42.13 kg/m2 as of 01/15/24 15:16 PST  Assessment and plan: Chronic, stable. Encouraged healthy diet and physical activity changes with a goal of weight loss. Follow up at least annually.  starting semaglutide 3 mg daily;.Explained to the patient risk of pancreatitis and GI side effects and patient expressed understanding and still proceed with starting semaglutide, patient states that she does not like to do subcu so she really prefers oral medicines so I am starting rybelsus  Diagnosis to address: I70.0 - Atherosclerosis of aorta (HCC)  Assessment and plan: Chronic, stable. Continue with current defined treatment plan: continue crestor 10 mg daily. Follow-up at least annually.  Diagnosis: D80.1 - Hypogammaglobulinemia (HCC)  Assessment and plan: Chronic, stable. Continue with current defined treatment plan: continue monitoring gammalobulin by neurology. Follow-up at least annually.  Diagnosis: G35 - Multiple sclerosis (HCC)  Assessment and plan: Chronic, stable. Continue with current defined treatment plan: follow with neurology. Follow-up at least annually.  Last edited 01/15/24 15:51 PST by Xander Maldonado M.D.       1. Dyslipidemia  Comp Metabolic Panel    Lipid Profile      2. Hypertension, unspecified type  Comp  Metabolic Panel    Lipid Profile    MICROALBUMIN CREAT RATIO URINE      3. Prediabetes  HEMOGLOBIN A1C      4. Morbid obesity with BMI of 40.0-44.9, adult ([facility])  Referral to Nutrition Services      5. Chronic pain of left knee  Referral to Orthopedics      6. Hiatal hernia        7. Morbid (severe) obesity due to excess calories ([facility])        8. Body mass index (BMI) 40.0-44.9, adult ([facility])        9. Atherosclerosis of aorta ([facility])        10. Hypogammaglobulinemia (HCC)        11. Multiple sclerosis ([facility])        12. Stage 3a chronic kidney disease (CKD) ([facility])        13. Other low back pain  Referral to Pain Clinic      14. Hypothyroidism, unspecified type  TSH WITH REFLEX TO FT4         I spent a total of 41 minutes with record review, exam, communication with the patient, communication with other providers, and documentation of this encounter.    Return in about 3 months (around 4/15/2024).  For semaglutide    Please note that this dictation was created using voice recognition software. I have made every reasonable attempt to correct obvious errors, but I expect that there are errors of grammar and possibly content that I did not discover before finalizing the note.

## 2024-01-23 ENCOUNTER — OFFICE VISIT (OUTPATIENT)
Dept: PHYSICAL MEDICINE AND REHAB | Facility: MEDICAL CENTER | Age: 74
End: 2024-01-23
Payer: MEDICARE

## 2024-01-23 VITALS
SYSTOLIC BLOOD PRESSURE: 124 MMHG | HEART RATE: 88 BPM | HEIGHT: 66 IN | TEMPERATURE: 97.4 F | OXYGEN SATURATION: 98 % | BODY MASS INDEX: 40.92 KG/M2 | WEIGHT: 254.6 LBS | DIASTOLIC BLOOD PRESSURE: 82 MMHG

## 2024-01-23 DIAGNOSIS — M47.816 LUMBAR SPONDYLOSIS: ICD-10-CM

## 2024-01-23 DIAGNOSIS — M53.3 SACROILIAC JOINT DYSFUNCTION OF RIGHT SIDE: ICD-10-CM

## 2024-01-23 DIAGNOSIS — G89.29 CHRONIC RIGHT-SIDED LOW BACK PAIN WITHOUT SCIATICA: ICD-10-CM

## 2024-01-23 DIAGNOSIS — Z71.82 EXERCISE COUNSELING: ICD-10-CM

## 2024-01-23 DIAGNOSIS — M54.50 CHRONIC RIGHT-SIDED LOW BACK PAIN WITHOUT SCIATICA: ICD-10-CM

## 2024-01-23 DIAGNOSIS — M25.562 CHRONIC PAIN OF LEFT KNEE: ICD-10-CM

## 2024-01-23 DIAGNOSIS — G89.29 CHRONIC PAIN OF LEFT KNEE: ICD-10-CM

## 2024-01-23 PROCEDURE — 99204 OFFICE O/P NEW MOD 45 MIN: CPT | Performed by: PHYSICAL MEDICINE & REHABILITATION

## 2024-01-23 PROCEDURE — 1125F AMNT PAIN NOTED PAIN PRSNT: CPT | Performed by: PHYSICAL MEDICINE & REHABILITATION

## 2024-01-23 PROCEDURE — 3074F SYST BP LT 130 MM HG: CPT | Performed by: PHYSICAL MEDICINE & REHABILITATION

## 2024-01-23 PROCEDURE — 3079F DIAST BP 80-89 MM HG: CPT | Performed by: PHYSICAL MEDICINE & REHABILITATION

## 2024-01-23 ASSESSMENT — ENCOUNTER SYMPTOMS
TINGLING: 0
PARESIS: 0
PERIANAL NUMBNESS: 0
BACK PAIN: 1
PARESTHESIAS: 0

## 2024-01-23 ASSESSMENT — PATIENT HEALTH QUESTIONNAIRE - PHQ9: CLINICAL INTERPRETATION OF PHQ2 SCORE: 0

## 2024-01-23 ASSESSMENT — PAIN SCALES - GENERAL: PAINLEVEL: 7=MODERATE-SEVERE PAIN

## 2024-01-24 ENCOUNTER — APPOINTMENT (OUTPATIENT)
Dept: RADIOLOGY | Facility: REHABILITATION | Age: 74
End: 2024-01-24
Attending: PHYSICAL MEDICINE & REHABILITATION
Payer: MEDICARE

## 2024-01-24 ENCOUNTER — HOSPITAL ENCOUNTER (OUTPATIENT)
Facility: REHABILITATION | Age: 74
End: 2024-01-24
Attending: PHYSICAL MEDICINE & REHABILITATION | Admitting: PHYSICAL MEDICINE & REHABILITATION
Payer: MEDICARE

## 2024-01-24 VITALS
SYSTOLIC BLOOD PRESSURE: 137 MMHG | RESPIRATION RATE: 16 BRPM | TEMPERATURE: 98.6 F | DIASTOLIC BLOOD PRESSURE: 60 MMHG | OXYGEN SATURATION: 97 % | WEIGHT: 252.65 LBS | BODY MASS INDEX: 40.6 KG/M2 | HEART RATE: 80 BPM | HEIGHT: 66 IN

## 2024-01-24 PROCEDURE — 77002 NEEDLE LOCALIZATION BY XRAY: CPT

## 2024-01-24 PROCEDURE — 700111 HCHG RX REV CODE 636 W/ 250 OVERRIDE (IP): Mod: JZ

## 2024-01-24 PROCEDURE — G0260 INJ FOR SACROILIAC JT ANESTH: HCPCS

## 2024-01-24 RX ORDER — DEXAMETHASONE SODIUM PHOSPHATE 10 MG/ML
INJECTION, SOLUTION INTRAMUSCULAR; INTRAVENOUS
Status: COMPLETED
Start: 2024-01-24 | End: 2024-01-24

## 2024-01-24 RX ORDER — LIDOCAINE HYDROCHLORIDE 10 MG/ML
INJECTION, SOLUTION EPIDURAL; INFILTRATION; INTRACAUDAL; PERINEURAL
Status: COMPLETED
Start: 2024-01-24 | End: 2024-01-24

## 2024-01-24 RX ORDER — ROPIVACAINE HYDROCHLORIDE 5 MG/ML
INJECTION, SOLUTION EPIDURAL; INFILTRATION; PERINEURAL
Status: DISCONTINUED
Start: 2024-01-24 | End: 2024-01-24 | Stop reason: HOSPADM

## 2024-01-24 RX ADMIN — DEXAMETHASONE SODIUM PHOSPHATE 10 MG: 10 INJECTION, SOLUTION INTRAMUSCULAR; INTRAVENOUS at 09:47

## 2024-01-24 RX ADMIN — LIDOCAINE HYDROCHLORIDE 10 ML: 10 INJECTION, SOLUTION EPIDURAL; INFILTRATION; INTRACAUDAL; PERINEURAL at 09:46

## 2024-01-24 ASSESSMENT — PAIN DESCRIPTION - PAIN TYPE
TYPE: CHRONIC PAIN
TYPE: CHRONIC PAIN

## 2024-01-24 NOTE — OR SURGEON
Immediate Post OP Note    Pre-Op Diagnosis Codes:     * Sacroiliac joint dysfunction of right side [M53.3]      Post-Op Diagnosis Codes:     * Sacroiliac joint dysfunction of right side [M53.3]      Procedure(s):  RIGHT sacroiliac joint injection with fluoroscopic guidance - Wound Class: Clean    Surgeon(s):  Lamont Vinson M.D.    Anesthesiologist/Type of Anesthesia:  No anesthesia staff entered./Local    Surgical Staff:  Circulator: Marine Lorenzo R.N.  Scrub Person: Sravanthi Holcomb  Radiology Technologist: Quiana Vincent    Specimens removed if any:  * No specimens in log *    Estimated Blood Loss: None    Findings: None    Complications: None        1/24/2024 9:55 AM Lamont Vinson M.D.

## 2024-01-24 NOTE — PROGRESS NOTES
0834 Pt received to pre procedure area. ID band and allergies verified. Vital signs taken and stable. Verified that patient has not taken NSAIDS, anticoagulants or blood thinners in past 5 days. Pt's history reviewed. Reviewed post op instructions with patient, questions answered, verbalized understanding. Pt seen by Dr. Vinson  pre procedure discussed, questions answered.     0950  Pt received to recovery area, report received from procedure RN Amanda . Vitals taken and stable. Patient tolerated po fluids and snack without difficulty. Dressing clean, dry and intact. Ice pack applied over dressing.     1005 Pt seen by Dr. Vinson post procedure, orders received for discharge. Patient ambulatory without difficulty. Pt discharged to designated .

## 2024-01-24 NOTE — OP REPORT
Date of Service: 1/24/2024     Patient: Lisa Chand 73 y.o. female     MRN: [id number]     Physician/s: Lamont Vinson MD    Pre-operative Diagnosis: Sacroiliac dysfunction    Post-operative Diagnosis: Sacroiliac dysfunction     Procedure: RIGHT   Sacroiliac joint injection    Description of procedure:    The risks, benefits, and alternatives of the procedure were reviewed and discussed with the patient.  Written informed consent was freely obtained. A pre-procedural time-out was conducted by the physician verifying patient’s identity, procedure to be performed, procedure site and side, and allergy verification. Appropriate equipment was determined to be in place for the procedure.         In the fluoroscopy suite the patient was placed in a prone position and the skin was prepped and draped in the usual sterile fashion.     The fluoroscope was placed over the right  sacroiliac joint at the appropriate angle for joint entrance, and the target for injection was marked. A 27g needle was placed into each of the marked level(s), and approx 1mL of 1% Lidocaine was injected subcutaneously into the epidermal and dermal layers. The needle was removed. A 25g 3.5 inch needle was then placed down to the level of bone at the posterior aspect of the joint. The needle was then carefully advanced into the joint capsule. The needle tip was then verified by a lateral view. In the AP view, contrast dye was used to highlight the joint line while the fluoroscope was running live. Following negative aspiration, approx 0.5mL of 1% lidocaine  with 0.5mL of 10mg/mL dexamethasone was then injected. The needle was removed intact after restyleted.       The patient's low back was covered with a 4x4 gauze, the area was cleansed with sterile normal saline, and a dressing was applied. There were no complications noted.     The patient had 100% pain relief postprocedure  Preprocedure pain 5/10 NRS index pain  Postprocedure pain 0 /10  NRS index pain    Follow-up as scheduled    Lamont Vinson MD  Physical Medicine and Rehabilitation  Interventional Spine and Sports Physiatry  [facility]  1/24/2024        CPT  sacroiliac joint with fluoroscopy 86701 . Please note that facilities often bill  instead of the physician code [id number].

## 2024-01-24 NOTE — H&P (VIEW-ONLY)
New patient note    Interventional spine and Pain  Physiatry (physical medicine and  Rehabilitation)     Date of service: See epic    Chief complaint:   Chief Complaint   Patient presents with    New Patient     Back pain        Referring provider: Xander Maldonado M.D.     HISTORY    HPI: Lisa Chand 73 y.o.  who presents today with Diagnoses of Sacroiliac joint dysfunction of right side, Chronic right-sided low back pain without sciatica, Lumbar spondylosis, Chronic pain of left knee, followed by orthopedics, BMI 40.0-44.9, adult ([facility]), and Exercise counseling were pertinent to this visit.    Back Pain  This is a chronic problem. Episode onset: gradual onset the last several years. The problem occurs constantly. The problem has been gradually worsening since onset. The pain is present in the gluteal, lumbar spine and sacro-iliac. The quality of the pain is described as aching. The pain does not radiate. The pain is at a severity of 7/10. The symptoms are aggravated by standing and twisting (walking). Pertinent negatives include no paresis, paresthesias, pelvic pain, perianal numbness or tingling. She has tried analgesics, home exercises, heat, muscle relaxant, NSAIDs, walking and ice for the symptoms. The treatment provided no relief.   Knee Pain  This is a chronic (including the past five years) problem. Episode onset: gradual. The problem occurs constantly. The problem has been gradually improving. The treatment provided no relief (she has follow up with orthopedics).              Medical records review:  I reviewed the note from the referring provider Xander Maldonado M.D. including the note dated 1/15/2024.           ROS:   Red Flags ROS:   Fever, Chills, Sweats: Denies  Involuntary Weight Loss: Denies  Bladder Incontinence: Denies  Bowel Incontinence: denies  Saddle Anesthesia: Denies    All other systems reviewed and negative.       PMHx:   Past Medical History:   Diagnosis Date    Right trigeminal neuralgia  07/30/2013         Current Outpatient Medications on File Prior to Visit   Medication Sig Dispense Refill    Semaglutide 3 MG Tab Take 1 Tablet by mouth every day. 100 Tablet 2    gabapentin (NEURONTIN) 100 MG Cap Take 3 Capsules by mouth at bedtime. 300 Capsule 2    ROPINIRole (REQUIP) 1 MG Tab Take 1 Tablet by mouth 4 times a day. 400 Tablet 2    omeprazole (PRILOSEC) 20 MG delayed-release capsule Take 1 Capsule by mouth every day. 100 Capsule 2    rosuvastatin (CRESTOR) 10 MG Tab Take 1 Tablet by mouth every evening. 100 Tablet 2    levothyroxine (SYNTHROID) 88 MCG Tab Take 1 Tablet by mouth every morning on an empty stomach. 100 Tablet 2    losartan-hydrochlorothiazide (HYZAAR) 50-12.5 MG per tablet Take 2 Tablets by mouth every day. 200 Tablet 2    FLUoxetine (PROZAC) 10 MG Cap Take 10 mg by mouth every day.       No current facility-administered medications on file prior to visit.        PSHx:   History reviewed. No pertinent surgical history.    Family history   History reviewed. No pertinent family history.      Medications: reviewed on epic.   Outpatient Medications Marked as Taking for the 1/23/24 encounter (Office Visit) with Lamont Vinson M.D.   Medication Sig Dispense Refill    Semaglutide 3 MG Tab Take 1 Tablet by mouth every day. 100 Tablet 2    gabapentin (NEURONTIN) 100 MG Cap Take 3 Capsules by mouth at bedtime. 300 Capsule 2    ROPINIRole (REQUIP) 1 MG Tab Take 1 Tablet by mouth 4 times a day. 400 Tablet 2    omeprazole (PRILOSEC) 20 MG delayed-release capsule Take 1 Capsule by mouth every day. 100 Capsule 2    rosuvastatin (CRESTOR) 10 MG Tab Take 1 Tablet by mouth every evening. 100 Tablet 2    levothyroxine (SYNTHROID) 88 MCG Tab Take 1 Tablet by mouth every morning on an empty stomach. 100 Tablet 2    losartan-hydrochlorothiazide (HYZAAR) 50-12.5 MG per tablet Take 2 Tablets by mouth every day. 200 Tablet 2    FLUoxetine (PROZAC) 10 MG Cap Take 10 mg by mouth every day.          Allergies:  "  Allergies   Allergen Reactions    Shellfish Allergy Rash    Lisinopril      angioedema       Social Hx:   Social History     Socioeconomic History    Marital status:      Spouse name: Not on file    Number of children: Not on file    Years of education: Not on file    Highest education level: Not on file   Occupational History    Not on file   Tobacco Use    Smoking status: Never    Smokeless tobacco: Never   Vaping Use    Vaping Use: Never used   Substance and Sexual Activity    Alcohol use: Not Currently     Comment: occ    Drug use: Never    Sexual activity: Not on file   Other Topics Concern    Not on file   Social History Narrative    Not on file     Social Determinants of Health     Financial Resource Strain: Not on file   Food Insecurity: Not on file   Transportation Needs: Not on file   Physical Activity: Not on file   Stress: Not on file   Social Connections: Not on file   Intimate Partner Violence: Not on file   Housing Stability: Not on file         EXAMINATION     Physical Exam:   Vitals: /82 (BP Location: Right arm, Patient Position: Sitting, BP Cuff Size: Adult)   Pulse 88   Temp 36.3 °C (97.4 °F) (Temporal)   Ht 1.676 m (5' 6\")   Wt 115 kg (254 lb 9.6 oz)   SpO2 98%     Constitutional:   Body Habitus: Body mass index is 41.09 kg/m².  Cooperation: Fully cooperates with exam  Appearance: Well-groomed, well-nourished, not disheveled     Eyes: No scleral icterus to suggest severe liver disease, no proptosis to suggest severe hyperthyroid    ENT -no obvious auditory deficits, no obvious tongue lesions, tongue midline, no facial droop     Skin -no rashes or lesions noted     Respiratory-  breathing comfortable on room air, no audible wheezing    Cardiovascular- capillary refills less than 2 seconds.     Psychiatric- alert and oriented ×3. Normal affect.       Musculoskeletal and Neuro -     Sacroiliac joint maneuvers  Thigh thrust positive right, negative left    Malgorzata's positive right, " negative left    Gaenslen's positive right, negative left    SI joint distraction positive right, negative left    SI joint compression positive right, negative left    Tenderness to palpation of sacroiliac joint: positive right, negative left       Thoracic/Lumbar Spine/Sacral Spine/Hips   Inspection: No evidence of atrophy in bilateral lower extremities throughout     ROM: decreased active range of motion with flexion, lateral flexion, and rotation bilaterally.   There is decreased active range of motion with lumbar extension with pain.    There is pain with facet loading maneuver (extension rotation) with axial low back pain on the RIGHT side(s)    Palpation:   No tenderness to palpation in midline at T1-T12 levels. No tenderness to palpation in the left and right of the midline T1-L5, NEGATIVE for tenderness to palpation to the para-midline region in the lower lumbar levels.    palpation in hip or over the gluteus medius tendon insertion: negative bilaterally      Lumbar spine Special tests  Neuro tension  Straight leg test negative bilaterally    Slump test negative bilaterally      HIP  FAIR test negative bilaterally    Range of motion in the RIGHT hip is full  in flexion, extension, abduction, internal rotation, external rotation.  Range of motion in the LEFT hip is full  in flexion, extension, abduction, internal rotation, external rotation.        Key points for the international standards for neurological classification of spinal cord injury (ISNCSCI) to light touch.     Dermatome R L                                      L2 2 2   L3 2 2   L4 1 1   L5 1 1   S1 1 1   S2 2 2       Motor Exam Lower Extremities    ? Myotome R L   Hip flexion L2 5 5   Knee extension L3 5 5   Ankle dorsiflexion L4 5 5   Toe extension L5 5 5   Ankle plantarflexion S1 5 5         Reflexes      Babinski sign negative bilaterally   Clonus of the ankle negative bilaterally       MEDICAL DECISION MAKING    Medical records review: see  "under HPI section.     DATA    Labs:   No results found for: \"SODIUM\", \"POTASSIUM\", \"CHLORIDE\", \"CO2\", \"ANION\", \"GLUCOSE\", \"BUN\", \"CREATININE\", \"CALCIUM\", \"ASTSGOT\", \"ALTSGPT\", \"TBILIRUBIN\", \"ALBUMIN\", \"TOTPROTEIN\", \"GLOBULIN\", \"AGRATIO\"]    No results found for: \"PROTHROMBTM\", \"INR\"     No results found for: \"WBC\", \"RBC\", \"HEMOGLOBIN\", \"HEMATOCRIT\", \"MCV\", \"MCH\", \"MCHC\", \"MPV\", \"NEUTSPOLYS\", \"LYMPHOCYTES\", \"MONOCYTES\", \"EOSINOPHILS\", \"BASOPHILS\", \"HYPOCHROMIA\", \"ANISOCYTOSIS\"     Lab Results   Component Value Date/Time    HBA1C 6.2 (H) 08/07/2023 09:39 AM        Imaging:   I personally reviewed following images, these are my reads          IMAGING radiology reads. I reviewed the following radiology reads                                               MRI lumbar spine 2/14/2022  LEVEL BY LEVEL:                                                         L1/2: No significant disc bulge. Normal facet joints and foramina..       L2/3: Mild 2 mm annular disc bulge. Mild facet arthrosis with mild      right foraminal narrowing. No significant central stenosis.               L3/4: 2 mm posterior disc bulge. Facet arthrosis with mild right        foraminal narrowing. Thickening of the ligamentum flavum with           resulting mild indentation of the posterolateral thecal sac but no      significant central spinal stenosis..                                     L4/5: Mild 1-2 mm disc bulge. Facet arthrosis with mild left            foraminal narrowing. Thickening of the ligamentum flavum with           resulting mild indentation of the posterolateral thecal sac but no      significant central stenosis..                                            L5-S1: No significant disc bulge. Facet arthrosis with mild reactive    edema in the articular facets bilaterally. No significant foraminal     narrowing. No central stenosis.                                           IMPRESSION:                                                           "     Exaggerated lumbosacral lordosis. Mild multilevel disc bulging and      facet arthrosis as described. No significant central spinal stenosis.   See detailed discussion above for level by level findings                             Results for orders placed in visit on 11/24/23    DX-FINGER(S) 2+ RIGHT    Impression  1.  No acute osseous abnormality.  2.  Advanced osteoarthritis of the interphalangeal joints.                                        Diagnosis   Visit Diagnoses     ICD-10-CM   1. Sacroiliac joint dysfunction of right side  M53.3   2. Chronic right-sided low back pain without sciatica  M54.50    G89.29   3. Lumbar spondylosis  M47.816   4. Chronic pain of left knee, followed by orthopedics  M25.562    G89.29   5. BMI 40.0-44.9, adult ([facility])  Z68.41   6. Exercise counseling  Z71.82           ASSESSMENT AND PLAN:  Lisa CALISTA Jagruti 73 y.o. female      Lisa was seen today for new patient.    Diagnoses and all orders for this visit:    Sacroiliac joint dysfunction of right side  -     Referral to Physical Medicine Rehab  -     Referral to Physical Therapy    Chronic right-sided low back pain without sciatica  -     Referral to Physical Therapy    Lumbar spondylosis  -     Referral to Physical Therapy    Chronic pain of left knee, followed by orthopedics  -     Referral to Physical Therapy    BMI 40.0-44.9, adult ([facility])    Exercise counseling      I believe the majority patient's pain is coming from a combination of her right sacroiliac dysfunction which is causing the majority of her pain.  Also likely contributing to this is facet mediated pain in the right lumbar spine at L4-5 and L5-S1.  The patient is failed conservative treatments including physical therapy in the past which exacerbated symptoms.    Physical therapy: I ordered physical therapy to focus on strengthening and stretching.     home exercise program: I provided the patient with a strengthening and stretching with a home exercise program      Diagnostic workup: Procedure below for diagnostic and therapeutic purposes    Medications:   Continue gabapentin    Interventional program:   I have ordered a right sacroiliac joint injection with fluoroscopic guidance for diagnostic and therapeutic purposes.    The risks benefits and alternatives to this procedure were discussed and the patient wishes to proceed with the procedure. Risks include but are not limited to damage to surrounding structures, infection, bleeding, worsening of pain which can be permanent, weakness which can be permanent. Benefits include pain relief, improved function. Alternatives includes not doing the procedure.        I also discussed the case with the patient's  Rl who was at today's visit and assisted with the HPI    Follow-up: After the above diagnostic and therapeutic procedure          Please note that this dictation was created using voice recognition software. I have made every reasonable attempt to correct obvious errors but there may be errors of grammar and content that I may have overlooked prior to finalization of this note.      Lamont Vinson MD  Physical Medicine and Rehabilitation  Interventional Spine and Sports Physiatry  [facility]         CC Xander Maldonado M.D.

## 2024-01-24 NOTE — INTERVAL H&P NOTE
H&P reviewed. The patient was examined and there are no changes to the H&P     Lungs clear to auscultation bilaterally.  No abdominal tenderness.  Heart regular rate and rhythm.  Vitals reviewed.    Proceed with the originally scheduled procedure.  The risks, benefits and alternatives were discussed.  The patient understands these.    Pre-Op Diagnosis Codes:     * Sacroiliac joint dysfunction of right side [M53.3]  Procedure(s):  RIGHT sacroiliac joint injection with fluoroscopic guidance    Lamont Vinson MD  Physical Medicine and Rehabilitation  Interventional Spine and Sports Physiatry  [facility]

## 2024-01-24 NOTE — PROGRESS NOTES
New patient note    Interventional spine and Pain  Physiatry (physical medicine and  Rehabilitation)     Date of service: See epic    Chief complaint:   Chief Complaint   Patient presents with    New Patient     Back pain        Referring provider: Xander Maldonado M.D.     HISTORY    HPI: Lisa Chand 73 y.o.  who presents today with Diagnoses of Sacroiliac joint dysfunction of right side, Chronic right-sided low back pain without sciatica, Lumbar spondylosis, Chronic pain of left knee, followed by orthopedics, BMI 40.0-44.9, adult ([facility]), and Exercise counseling were pertinent to this visit.    Back Pain  This is a chronic problem. Episode onset: gradual onset the last several years. The problem occurs constantly. The problem has been gradually worsening since onset. The pain is present in the gluteal, lumbar spine and sacro-iliac. The quality of the pain is described as aching. The pain does not radiate. The pain is at a severity of 7/10. The symptoms are aggravated by standing and twisting (walking). Pertinent negatives include no paresis, paresthesias, pelvic pain, perianal numbness or tingling. She has tried analgesics, home exercises, heat, muscle relaxant, NSAIDs, walking and ice for the symptoms. The treatment provided no relief.   Knee Pain  This is a chronic (including the past five years) problem. Episode onset: gradual. The problem occurs constantly. The problem has been gradually improving. The treatment provided no relief (she has follow up with orthopedics).              Medical records review:  I reviewed the note from the referring provider Xander Maldonado M.D. including the note dated 1/15/2024.           ROS:   Red Flags ROS:   Fever, Chills, Sweats: Denies  Involuntary Weight Loss: Denies  Bladder Incontinence: Denies  Bowel Incontinence: denies  Saddle Anesthesia: Denies    All other systems reviewed and negative.       PMHx:   Past Medical History:   Diagnosis Date    Right trigeminal neuralgia  07/30/2013         Current Outpatient Medications on File Prior to Visit   Medication Sig Dispense Refill    Semaglutide 3 MG Tab Take 1 Tablet by mouth every day. 100 Tablet 2    gabapentin (NEURONTIN) 100 MG Cap Take 3 Capsules by mouth at bedtime. 300 Capsule 2    ROPINIRole (REQUIP) 1 MG Tab Take 1 Tablet by mouth 4 times a day. 400 Tablet 2    omeprazole (PRILOSEC) 20 MG delayed-release capsule Take 1 Capsule by mouth every day. 100 Capsule 2    rosuvastatin (CRESTOR) 10 MG Tab Take 1 Tablet by mouth every evening. 100 Tablet 2    levothyroxine (SYNTHROID) 88 MCG Tab Take 1 Tablet by mouth every morning on an empty stomach. 100 Tablet 2    losartan-hydrochlorothiazide (HYZAAR) 50-12.5 MG per tablet Take 2 Tablets by mouth every day. 200 Tablet 2    FLUoxetine (PROZAC) 10 MG Cap Take 10 mg by mouth every day.       No current facility-administered medications on file prior to visit.        PSHx:   History reviewed. No pertinent surgical history.    Family history   History reviewed. No pertinent family history.      Medications: reviewed on epic.   Outpatient Medications Marked as Taking for the 1/23/24 encounter (Office Visit) with Lamont Vinson M.D.   Medication Sig Dispense Refill    Semaglutide 3 MG Tab Take 1 Tablet by mouth every day. 100 Tablet 2    gabapentin (NEURONTIN) 100 MG Cap Take 3 Capsules by mouth at bedtime. 300 Capsule 2    ROPINIRole (REQUIP) 1 MG Tab Take 1 Tablet by mouth 4 times a day. 400 Tablet 2    omeprazole (PRILOSEC) 20 MG delayed-release capsule Take 1 Capsule by mouth every day. 100 Capsule 2    rosuvastatin (CRESTOR) 10 MG Tab Take 1 Tablet by mouth every evening. 100 Tablet 2    levothyroxine (SYNTHROID) 88 MCG Tab Take 1 Tablet by mouth every morning on an empty stomach. 100 Tablet 2    losartan-hydrochlorothiazide (HYZAAR) 50-12.5 MG per tablet Take 2 Tablets by mouth every day. 200 Tablet 2    FLUoxetine (PROZAC) 10 MG Cap Take 10 mg by mouth every day.          Allergies:  "  Allergies   Allergen Reactions    Shellfish Allergy Rash    Lisinopril      angioedema       Social Hx:   Social History     Socioeconomic History    Marital status:      Spouse name: Not on file    Number of children: Not on file    Years of education: Not on file    Highest education level: Not on file   Occupational History    Not on file   Tobacco Use    Smoking status: Never    Smokeless tobacco: Never   Vaping Use    Vaping Use: Never used   Substance and Sexual Activity    Alcohol use: Not Currently     Comment: occ    Drug use: Never    Sexual activity: Not on file   Other Topics Concern    Not on file   Social History Narrative    Not on file     Social Determinants of Health     Financial Resource Strain: Not on file   Food Insecurity: Not on file   Transportation Needs: Not on file   Physical Activity: Not on file   Stress: Not on file   Social Connections: Not on file   Intimate Partner Violence: Not on file   Housing Stability: Not on file         EXAMINATION     Physical Exam:   Vitals: /82 (BP Location: Right arm, Patient Position: Sitting, BP Cuff Size: Adult)   Pulse 88   Temp 36.3 °C (97.4 °F) (Temporal)   Ht 1.676 m (5' 6\")   Wt 115 kg (254 lb 9.6 oz)   SpO2 98%     Constitutional:   Body Habitus: Body mass index is 41.09 kg/m².  Cooperation: Fully cooperates with exam  Appearance: Well-groomed, well-nourished, not disheveled     Eyes: No scleral icterus to suggest severe liver disease, no proptosis to suggest severe hyperthyroid    ENT -no obvious auditory deficits, no obvious tongue lesions, tongue midline, no facial droop     Skin -no rashes or lesions noted     Respiratory-  breathing comfortable on room air, no audible wheezing    Cardiovascular- capillary refills less than 2 seconds.     Psychiatric- alert and oriented ×3. Normal affect.       Musculoskeletal and Neuro -     Sacroiliac joint maneuvers  Thigh thrust positive right, negative left    Malgorzata's positive right, " negative left    Gaenslen's positive right, negative left    SI joint distraction positive right, negative left    SI joint compression positive right, negative left    Tenderness to palpation of sacroiliac joint: positive right, negative left       Thoracic/Lumbar Spine/Sacral Spine/Hips   Inspection: No evidence of atrophy in bilateral lower extremities throughout     ROM: decreased active range of motion with flexion, lateral flexion, and rotation bilaterally.   There is decreased active range of motion with lumbar extension with pain.    There is pain with facet loading maneuver (extension rotation) with axial low back pain on the RIGHT side(s)    Palpation:   No tenderness to palpation in midline at T1-T12 levels. No tenderness to palpation in the left and right of the midline T1-L5, NEGATIVE for tenderness to palpation to the para-midline region in the lower lumbar levels.    palpation in hip or over the gluteus medius tendon insertion: negative bilaterally      Lumbar spine Special tests  Neuro tension  Straight leg test negative bilaterally    Slump test negative bilaterally      HIP  FAIR test negative bilaterally    Range of motion in the RIGHT hip is full  in flexion, extension, abduction, internal rotation, external rotation.  Range of motion in the LEFT hip is full  in flexion, extension, abduction, internal rotation, external rotation.        Key points for the international standards for neurological classification of spinal cord injury (ISNCSCI) to light touch.     Dermatome R L                                      L2 2 2   L3 2 2   L4 1 1   L5 1 1   S1 1 1   S2 2 2       Motor Exam Lower Extremities    ? Myotome R L   Hip flexion L2 5 5   Knee extension L3 5 5   Ankle dorsiflexion L4 5 5   Toe extension L5 5 5   Ankle plantarflexion S1 5 5         Reflexes      Babinski sign negative bilaterally   Clonus of the ankle negative bilaterally       MEDICAL DECISION MAKING    Medical records review: see  "under HPI section.     DATA    Labs:   No results found for: \"SODIUM\", \"POTASSIUM\", \"CHLORIDE\", \"CO2\", \"ANION\", \"GLUCOSE\", \"BUN\", \"CREATININE\", \"CALCIUM\", \"ASTSGOT\", \"ALTSGPT\", \"TBILIRUBIN\", \"ALBUMIN\", \"TOTPROTEIN\", \"GLOBULIN\", \"AGRATIO\"]    No results found for: \"PROTHROMBTM\", \"INR\"     No results found for: \"WBC\", \"RBC\", \"HEMOGLOBIN\", \"HEMATOCRIT\", \"MCV\", \"MCH\", \"MCHC\", \"MPV\", \"NEUTSPOLYS\", \"LYMPHOCYTES\", \"MONOCYTES\", \"EOSINOPHILS\", \"BASOPHILS\", \"HYPOCHROMIA\", \"ANISOCYTOSIS\"     Lab Results   Component Value Date/Time    HBA1C 6.2 (H) 08/07/2023 09:39 AM        Imaging:   I personally reviewed following images, these are my reads          IMAGING radiology reads. I reviewed the following radiology reads                                               MRI lumbar spine 2/14/2022  LEVEL BY LEVEL:                                                         L1/2: No significant disc bulge. Normal facet joints and foramina..       L2/3: Mild 2 mm annular disc bulge. Mild facet arthrosis with mild      right foraminal narrowing. No significant central stenosis.               L3/4: 2 mm posterior disc bulge. Facet arthrosis with mild right        foraminal narrowing. Thickening of the ligamentum flavum with           resulting mild indentation of the posterolateral thecal sac but no      significant central spinal stenosis..                                     L4/5: Mild 1-2 mm disc bulge. Facet arthrosis with mild left            foraminal narrowing. Thickening of the ligamentum flavum with           resulting mild indentation of the posterolateral thecal sac but no      significant central stenosis..                                            L5-S1: No significant disc bulge. Facet arthrosis with mild reactive    edema in the articular facets bilaterally. No significant foraminal     narrowing. No central stenosis.                                           IMPRESSION:                                                           "     Exaggerated lumbosacral lordosis. Mild multilevel disc bulging and      facet arthrosis as described. No significant central spinal stenosis.   See detailed discussion above for level by level findings                             Results for orders placed in visit on 11/24/23    DX-FINGER(S) 2+ RIGHT    Impression  1.  No acute osseous abnormality.  2.  Advanced osteoarthritis of the interphalangeal joints.                                        Diagnosis   Visit Diagnoses     ICD-10-CM   1. Sacroiliac joint dysfunction of right side  M53.3   2. Chronic right-sided low back pain without sciatica  M54.50    G89.29   3. Lumbar spondylosis  M47.816   4. Chronic pain of left knee, followed by orthopedics  M25.562    G89.29   5. BMI 40.0-44.9, adult ([facility])  Z68.41   6. Exercise counseling  Z71.82           ASSESSMENT AND PLAN:  Lisa CALISTA Jagruti 73 y.o. female      Lisa was seen today for new patient.    Diagnoses and all orders for this visit:    Sacroiliac joint dysfunction of right side  -     Referral to Physical Medicine Rehab  -     Referral to Physical Therapy    Chronic right-sided low back pain without sciatica  -     Referral to Physical Therapy    Lumbar spondylosis  -     Referral to Physical Therapy    Chronic pain of left knee, followed by orthopedics  -     Referral to Physical Therapy    BMI 40.0-44.9, adult ([facility])    Exercise counseling      I believe the majority patient's pain is coming from a combination of her right sacroiliac dysfunction which is causing the majority of her pain.  Also likely contributing to this is facet mediated pain in the right lumbar spine at L4-5 and L5-S1.  The patient is failed conservative treatments including physical therapy in the past which exacerbated symptoms.    Physical therapy: I ordered physical therapy to focus on strengthening and stretching.     home exercise program: I provided the patient with a strengthening and stretching with a home exercise program      Diagnostic workup: Procedure below for diagnostic and therapeutic purposes    Medications:   Continue gabapentin    Interventional program:   I have ordered a right sacroiliac joint injection with fluoroscopic guidance for diagnostic and therapeutic purposes.    The risks benefits and alternatives to this procedure were discussed and the patient wishes to proceed with the procedure. Risks include but are not limited to damage to surrounding structures, infection, bleeding, worsening of pain which can be permanent, weakness which can be permanent. Benefits include pain relief, improved function. Alternatives includes not doing the procedure.        I also discussed the case with the patient's  Rl who was at today's visit and assisted with the HPI    Follow-up: After the above diagnostic and therapeutic procedure          Please note that this dictation was created using voice recognition software. I have made every reasonable attempt to correct obvious errors but there may be errors of grammar and content that I may have overlooked prior to finalization of this note.      Lamont Vinson MD  Physical Medicine and Rehabilitation  Interventional Spine and Sports Physiatry  [facility]         CC Xander Maldonado M.D.

## 2024-01-25 ENCOUNTER — TELEPHONE (OUTPATIENT)
Dept: PHYSICAL MEDICINE AND REHAB | Facility: MEDICAL CENTER | Age: 74
End: 2024-01-25
Payer: MEDICARE

## 2024-01-25 NOTE — TELEPHONE ENCOUNTER
Called for Post -SP check up: right sacroiliac joint injection with fluoroscopic guidance     Change in pain: Yes    Concerns? No    Confirmed [facility] appt? Yes

## 2024-01-26 ENCOUNTER — HOSPITAL ENCOUNTER (OUTPATIENT)
Dept: LAB | Facility: MEDICAL CENTER | Age: 74
End: 2024-01-26
Attending: STUDENT IN AN ORGANIZED HEALTH CARE EDUCATION/TRAINING PROGRAM
Payer: MEDICARE

## 2024-01-26 DIAGNOSIS — R73.03 PREDIABETES: Chronic | ICD-10-CM

## 2024-01-26 DIAGNOSIS — G25.81 RESTLESS LEG SYNDROME: ICD-10-CM

## 2024-01-26 DIAGNOSIS — I10 HYPERTENSION, UNSPECIFIED TYPE: Chronic | ICD-10-CM

## 2024-01-26 DIAGNOSIS — Z11.4 SCREENING FOR HIV (HUMAN IMMUNODEFICIENCY VIRUS): ICD-10-CM

## 2024-01-26 DIAGNOSIS — E78.5 DYSLIPIDEMIA: Chronic | ICD-10-CM

## 2024-01-26 DIAGNOSIS — E03.9 HYPOTHYROIDISM, UNSPECIFIED TYPE: Chronic | ICD-10-CM

## 2024-01-26 LAB
ALBUMIN SERPL BCP-MCNC: 4.1 G/DL (ref 3.2–4.9)
ALBUMIN/GLOB SERPL: 1.2 G/DL
ALP SERPL-CCNC: 107 U/L (ref 30–99)
ALT SERPL-CCNC: 25 U/L (ref 2–50)
ANION GAP SERPL CALC-SCNC: 14 MMOL/L (ref 7–16)
AST SERPL-CCNC: 34 U/L (ref 12–45)
BILIRUB SERPL-MCNC: 0.2 MG/DL (ref 0.1–1.5)
BUN SERPL-MCNC: 23 MG/DL (ref 8–22)
CALCIUM ALBUM COR SERPL-MCNC: 9.7 MG/DL (ref 8.5–10.5)
CALCIUM SERPL-MCNC: 9.8 MG/DL (ref 8.5–10.5)
CHLORIDE SERPL-SCNC: 102 MMOL/L (ref 96–112)
CHOLEST SERPL-MCNC: 139 MG/DL (ref 100–199)
CO2 SERPL-SCNC: 27 MMOL/L (ref 20–33)
CREAT SERPL-MCNC: 0.91 MG/DL (ref 0.5–1.4)
FASTING STATUS PATIENT QL REPORTED: NORMAL
FERRITIN SERPL-MCNC: 46.1 NG/ML (ref 10–291)
GFR SERPLBLD CREATININE-BSD FMLA CKD-EPI: 66 ML/MIN/1.73 M 2
GLOBULIN SER CALC-MCNC: 3.4 G/DL (ref 1.9–3.5)
GLUCOSE SERPL-MCNC: 91 MG/DL (ref 65–99)
HDLC SERPL-MCNC: 59 MG/DL
HIV 1+2 AB+HIV1 P24 AG SERPL QL IA: NORMAL
IRON SATN MFR SERPL: 19 % (ref 15–55)
IRON SERPL-MCNC: 73 UG/DL (ref 40–170)
LDLC SERPL CALC-MCNC: 57 MG/DL
POTASSIUM SERPL-SCNC: 4.3 MMOL/L (ref 3.6–5.5)
PROT SERPL-MCNC: 7.5 G/DL (ref 6–8.2)
SODIUM SERPL-SCNC: 143 MMOL/L (ref 135–145)
TIBC SERPL-MCNC: 390 UG/DL (ref 250–450)
TRIGL SERPL-MCNC: 116 MG/DL (ref 0–149)
TSH SERPL DL<=0.005 MIU/L-ACNC: 1.88 UIU/ML (ref 0.38–5.33)
UIBC SERPL-MCNC: 317 UG/DL (ref 110–370)

## 2024-01-26 PROCEDURE — 84443 ASSAY THYROID STIM HORMONE: CPT

## 2024-01-26 PROCEDURE — 80061 LIPID PANEL: CPT

## 2024-01-26 PROCEDURE — 80053 COMPREHEN METABOLIC PANEL: CPT

## 2024-01-26 PROCEDURE — 82728 ASSAY OF FERRITIN: CPT

## 2024-01-26 PROCEDURE — 36415 COLL VENOUS BLD VENIPUNCTURE: CPT

## 2024-01-26 PROCEDURE — 83550 IRON BINDING TEST: CPT

## 2024-01-26 PROCEDURE — 82043 UR ALBUMIN QUANTITATIVE: CPT

## 2024-01-26 PROCEDURE — 87389 HIV-1 AG W/HIV-1&-2 AB AG IA: CPT

## 2024-01-26 PROCEDURE — 83036 HEMOGLOBIN GLYCOSYLATED A1C: CPT

## 2024-01-26 PROCEDURE — 82570 ASSAY OF URINE CREATININE: CPT

## 2024-01-26 PROCEDURE — 83540 ASSAY OF IRON: CPT

## 2024-01-27 LAB
CREAT UR-MCNC: 220.14 MG/DL
EST. AVERAGE GLUCOSE BLD GHB EST-MCNC: 134 MG/DL
HBA1C MFR BLD: 6.3 % (ref 4–5.6)
MICROALBUMIN UR-MCNC: 2 MG/DL
MICROALBUMIN/CREAT UR: 9 MG/G (ref 0–30)

## 2024-01-30 ENCOUNTER — DOCUMENTATION (OUTPATIENT)
Dept: HEALTH INFORMATION MANAGEMENT | Facility: OTHER | Age: 74
End: 2024-01-30
Payer: MEDICARE

## 2024-01-31 ENCOUNTER — TELEPHONE (OUTPATIENT)
Dept: MEDICAL GROUP | Facility: PHYSICIAN GROUP | Age: 74
End: 2024-01-31
Payer: MEDICARE

## 2024-02-13 ENCOUNTER — TELEPHONE (OUTPATIENT)
Dept: MEDICAL GROUP | Facility: PHYSICIAN GROUP | Age: 74
End: 2024-02-13
Payer: MEDICARE

## 2024-02-16 ENCOUNTER — HOSPITAL ENCOUNTER (OUTPATIENT)
Dept: RADIOLOGY | Facility: MEDICAL CENTER | Age: 74
End: 2024-02-16
Attending: STUDENT IN AN ORGANIZED HEALTH CARE EDUCATION/TRAINING PROGRAM
Payer: MEDICARE

## 2024-02-16 DIAGNOSIS — Z78.0 POSTMENOPAUSE: ICD-10-CM

## 2024-02-16 PROCEDURE — 77080 DXA BONE DENSITY AXIAL: CPT

## 2024-02-20 ENCOUNTER — TELEPHONE (OUTPATIENT)
Dept: MEDICAL GROUP | Facility: PHYSICIAN GROUP | Age: 74
End: 2024-02-20
Payer: MEDICARE

## 2024-02-27 ENCOUNTER — PATIENT MESSAGE (OUTPATIENT)
Dept: MEDICAL GROUP | Facility: PHYSICIAN GROUP | Age: 74
End: 2024-02-27
Payer: MEDICARE

## 2024-02-28 RX ORDER — SEMAGLUTIDE 0.25 MG/.5ML
0.25 INJECTION, SOLUTION SUBCUTANEOUS
Qty: 8 ML | Refills: 2 | Status: SHIPPED | OUTPATIENT
Start: 2024-02-28 | End: 2024-02-28

## 2024-03-05 ENCOUNTER — PATIENT MESSAGE (OUTPATIENT)
Dept: MEDICAL GROUP | Facility: PHYSICIAN GROUP | Age: 74
End: 2024-03-05

## 2024-03-05 ENCOUNTER — OFFICE VISIT (OUTPATIENT)
Dept: PHYSICAL MEDICINE AND REHAB | Facility: MEDICAL CENTER | Age: 74
End: 2024-03-05
Payer: MEDICARE

## 2024-03-05 VITALS
DIASTOLIC BLOOD PRESSURE: 82 MMHG | HEART RATE: 77 BPM | WEIGHT: 251.4 LBS | BODY MASS INDEX: 40.4 KG/M2 | HEIGHT: 66 IN | TEMPERATURE: 96.8 F | SYSTOLIC BLOOD PRESSURE: 124 MMHG | OXYGEN SATURATION: 99 %

## 2024-03-05 DIAGNOSIS — M54.50 CHRONIC RIGHT-SIDED LOW BACK PAIN WITHOUT SCIATICA: ICD-10-CM

## 2024-03-05 DIAGNOSIS — M47.816 LUMBAR SPONDYLOSIS: ICD-10-CM

## 2024-03-05 DIAGNOSIS — M25.562 CHRONIC PAIN OF LEFT KNEE: ICD-10-CM

## 2024-03-05 DIAGNOSIS — Z71.82 EXERCISE COUNSELING: ICD-10-CM

## 2024-03-05 DIAGNOSIS — G89.29 CHRONIC RIGHT-SIDED LOW BACK PAIN WITHOUT SCIATICA: ICD-10-CM

## 2024-03-05 DIAGNOSIS — M53.3 SACROILIAC JOINT DYSFUNCTION OF RIGHT SIDE: ICD-10-CM

## 2024-03-05 DIAGNOSIS — G89.29 CHRONIC PAIN OF LEFT KNEE: ICD-10-CM

## 2024-03-05 PROCEDURE — 3079F DIAST BP 80-89 MM HG: CPT | Performed by: PHYSICAL MEDICINE & REHABILITATION

## 2024-03-05 PROCEDURE — 3074F SYST BP LT 130 MM HG: CPT | Performed by: PHYSICAL MEDICINE & REHABILITATION

## 2024-03-05 PROCEDURE — 1125F AMNT PAIN NOTED PAIN PRSNT: CPT | Performed by: PHYSICAL MEDICINE & REHABILITATION

## 2024-03-05 PROCEDURE — 99214 OFFICE O/P EST MOD 30 MIN: CPT | Performed by: PHYSICAL MEDICINE & REHABILITATION

## 2024-03-05 ASSESSMENT — PAIN SCALES - GENERAL: PAINLEVEL: 3=SLIGHT PAIN

## 2024-03-05 ASSESSMENT — PATIENT HEALTH QUESTIONNAIRE - PHQ9: CLINICAL INTERPRETATION OF PHQ2 SCORE: 0

## 2024-03-05 NOTE — PROGRESS NOTES
Follow up patient note  Interventional spine and Pain  Physiatry (physical medicine and  Rehabilitation)       Chief complaint:   Chief Complaint   Patient presents with    Back Pain     Post SP Right SI joint inj          HISTORY    Please see new patient note by Dr Vinson,  for more details.     HPI  Patient identification: Lisa Chand ,  1950,   With Diagnoses of Sacroiliac joint dysfunction of right side, Chronic right-sided low back pain without sciatica, Lumbar spondylosis, Chronic pain of left knee, followed by orthopedics, BMI 40.0-44.9, adult ([facility]), and Exercise counseling were pertinent to this visit.     Procedures:  2024 right sacroiliac joint injection 100% improvement during the diagnostic phase.  60% improvement at the follow-up visit    The patient overall had excellent improvement in pain and function after the sacroiliac joint injection.  She has improved standing and sitting tolerance.  Improved function and ADLs.  Pain is currently 2 out of 10 in intensity NRS.  Denies radiating pain.       ROS Red Flags :   Fever, Chills, Sweats: Denies  Involuntary Weight Loss: Denies  Bowel/Bladder Incontinence: Denies  Saddle Anesthesia: Denies        PMHx:   Past Medical History:   Diagnosis Date    Right trigeminal neuralgia 2013       PSHx:   Past Surgical History:   Procedure Laterality Date    AR INJECTION,SACROILIAC JOINT Right 2024    Procedure: RIGHT sacroiliac joint injection with fluoroscopic guidance;  Surgeon: Lamont Vinson M.D.;  Location: [facility];  Service: Pain Management       Family history   History reviewed. No pertinent family history.      Medications:   Outpatient Medications Marked as Taking for the 3/5/24 encounter (Office Visit) with Lamont Vinson M.D.   Medication Sig Dispense Refill    gabapentin (NEURONTIN) 100 MG Cap Take 3 Capsules by mouth at bedtime. 300 Capsule 2    ROPINIRole (REQUIP) 1 MG Tab Take 1 Tablet by  mouth 4 times a day. 400 Tablet 2    omeprazole (PRILOSEC) 20 MG delayed-release capsule Take 1 Capsule by mouth every day. 100 Capsule 2    rosuvastatin (CRESTOR) 10 MG Tab Take 1 Tablet by mouth every evening. 100 Tablet 2    levothyroxine (SYNTHROID) 88 MCG Tab Take 1 Tablet by mouth every morning on an empty stomach. 100 Tablet 2    losartan-hydrochlorothiazide (HYZAAR) 50-12.5 MG per tablet Take 2 Tablets by mouth every day. 200 Tablet 2    FLUoxetine (PROZAC) 10 MG Cap Take 10 mg by mouth every day.          Current Outpatient Medications on File Prior to Visit   Medication Sig Dispense Refill    gabapentin (NEURONTIN) 100 MG Cap Take 3 Capsules by mouth at bedtime. 300 Capsule 2    ROPINIRole (REQUIP) 1 MG Tab Take 1 Tablet by mouth 4 times a day. 400 Tablet 2    omeprazole (PRILOSEC) 20 MG delayed-release capsule Take 1 Capsule by mouth every day. 100 Capsule 2    rosuvastatin (CRESTOR) 10 MG Tab Take 1 Tablet by mouth every evening. 100 Tablet 2    levothyroxine (SYNTHROID) 88 MCG Tab Take 1 Tablet by mouth every morning on an empty stomach. 100 Tablet 2    losartan-hydrochlorothiazide (HYZAAR) 50-12.5 MG per tablet Take 2 Tablets by mouth every day. 200 Tablet 2    FLUoxetine (PROZAC) 10 MG Cap Take 10 mg by mouth every day.      Semaglutide,0.25 or 0.5MG/DOS, 2 MG/3ML Solution Pen-injector Inject 0.25 mg under the skin every 7 days. 12 mL 2     No current facility-administered medications on file prior to visit.         Allergies:   Allergies   Allergen Reactions    Shellfish Allergy Rash    Lisinopril      angioedema       Social Hx:   Social History     Socioeconomic History    Marital status:      Spouse name: Not on file    Number of children: Not on file    Years of education: Not on file    Highest education level: Not on file   Occupational History    Not on file   Tobacco Use    Smoking status: Never    Smokeless tobacco: Never   Vaping Use    Vaping Use: Never used   Substance and Sexual  "Activity    Alcohol use: Not Currently     Comment: occ    Drug use: Never    Sexual activity: Not on file   Other Topics Concern    Not on file   Social History Narrative    Not on file     Social Determinants of Health     Financial Resource Strain: Not on file   Food Insecurity: Not on file   Transportation Needs: Not on file   Physical Activity: Not on file   Stress: Not on file   Social Connections: Not on file   Intimate Partner Violence: Not on file   Housing Stability: Not on file         EXAMINATION     Physical Exam:   Vitals: /82 (BP Location: Right arm, Patient Position: Sitting, BP Cuff Size: Adult)   Pulse 77   Temp 36 °C (96.8 °F) (Temporal)   Ht 1.676 m (5' 6\")   Wt 114 kg (251 lb 6.4 oz)   SpO2 99%     Constitutional:   Body Habitus: Body mass index is 40.58 kg/m².  Cooperation: Fully cooperates with exam  Appearance: Well-groomed no disheveled    Respiratory-  breathing comfortable on room air, no audible wheezing  Cardiovascular- capillary refills less than 2 seconds. No lower extremity edema is noted.   Psychiatric- alert and oriented ×3. Normal affect.    MSK and Neuro: -  Sacroiliac joint maneuvers  Thigh thrust negative bilaterally    Malgorzata's negative bilaterally    Gaenslen's negative bilaterally    SI joint distraction negative bilaterally    SI joint compression negative bilaterally    Tenderness to palpation of sacroiliac joint: negative bilaterally         MEDICAL DECISION MAKING    DATA    Labs:   Lab Results   Component Value Date/Time    SODIUM 143 01/26/2024 10:07 AM    POTASSIUM 4.3 01/26/2024 10:07 AM    CHLORIDE 102 01/26/2024 10:07 AM    CO2 27 01/26/2024 10:07 AM    GLUCOSE 91 01/26/2024 10:07 AM    BUN 23 (H) 01/26/2024 10:07 AM    CREATININE 0.91 01/26/2024 10:07 AM        No results found for: \"PROTHROMBTM\", \"INR\"     No results found for: \"WBC\", \"RBC\", \"HEMOGLOBIN\", \"HEMATOCRIT\", \"MCV\", \"MCH\", \"MCHC\", \"MPV\", \"NEUTSPOLYS\", \"LYMPHOCYTES\", \"MONOCYTES\", \"EOSINOPHILS\", " "\"BASOPHILS\", \"HYPOCHROMIA\", \"ANISOCYTOSIS\"     Lab Results   Component Value Date/Time    HBA1C 6.3 (H) 01/26/2024 10:07 AM          Imaging:   I personally reviewed following images            I reviewed the following radiology reports                     MRI lumbar spine 2/14/2022  LEVEL BY LEVEL:                                                         L1/2: No significant disc bulge. Normal facet joints and foramina..       L2/3: Mild 2 mm annular disc bulge. Mild facet arthrosis with mild      right foraminal narrowing. No significant central stenosis.               L3/4: 2 mm posterior disc bulge. Facet arthrosis with mild right        foraminal narrowing. Thickening of the ligamentum flavum with           resulting mild indentation of the posterolateral thecal sac but no      significant central spinal stenosis..                                     L4/5: Mild 1-2 mm disc bulge. Facet arthrosis with mild left            foraminal narrowing. Thickening of the ligamentum flavum with           resulting mild indentation of the posterolateral thecal sac but no      significant central stenosis..                                            L5-S1: No significant disc bulge. Facet arthrosis with mild reactive    edema in the articular facets bilaterally. No significant foraminal     narrowing. No central stenosis.                                           IMPRESSION:                                                               Exaggerated lumbosacral lordosis. Mild multilevel disc bulging and      facet arthrosis as described. No significant central spinal stenosis.   See detailed discussion above for level by level findings                                            Results for orders placed in visit on 11/24/23    DX-FINGER(S) 2+ RIGHT    Impression  1.  No acute osseous abnormality.  2.  Advanced osteoarthritis of the interphalangeal joints.                                        DIAGNOSIS   Visit Diagnoses     " ICD-10-CM   1. Sacroiliac joint dysfunction of right side  M53.3   2. Chronic right-sided low back pain without sciatica  M54.50    G89.29   3. Lumbar spondylosis  M47.816   4. Chronic pain of left knee, followed by orthopedics  M25.562    G89.29   5. BMI 40.0-44.9, adult ([facility])  Z68.41   6. Exercise counseling  Z71.82         ASSESSMENT and PLAN:     Lisa Chand  1950 female      Lisa was seen today for back pain.    Diagnoses and all orders for this visit:    Sacroiliac joint dysfunction of right side    Chronic right-sided low back pain without sciatica    Lumbar spondylosis    Chronic pain of left knee, followed by orthopedics    BMI 40.0-44.9, adult ([facility])    Exercise counseling      We discussed additions to home exercise program.  I advised the patient to continue with physical therapy as previously referred.  I also discussed case patient's  Rl who was at today's visit and assisted with the HPI.    Medications: Acetaminophen up to 1000 mg 3 times daily as needed not to exceed 3000 mg per 24-hours.    Follow up: After the above conservative treatments    Thank you for allowing me to participate in the care of this patient. If you have any questions please not hesitate to contact me.             Please note that this dictation was created using voice recognition software. I have made every reasonable attempt to correct obvious errors but there may be errors of grammar and content that I may have overlooked prior to finalization of this note.      Lamont Vinson MD  Interventional Spine and Sports Physiatry  Physical Medicine and Rehabilitation  [facility]

## 2024-03-19 ENCOUNTER — PATIENT MESSAGE (OUTPATIENT)
Dept: MEDICAL GROUP | Facility: PHYSICIAN GROUP | Age: 74
End: 2024-03-19
Payer: MEDICARE

## 2024-03-19 DIAGNOSIS — I07.1 TRICUSPID VALVE INSUFFICIENCY, UNSPECIFIED ETIOLOGY: ICD-10-CM

## 2024-03-27 ENCOUNTER — ANCILLARY PROCEDURE (OUTPATIENT)
Dept: CARDIOLOGY | Facility: MEDICAL CENTER | Age: 74
End: 2024-03-27
Attending: STUDENT IN AN ORGANIZED HEALTH CARE EDUCATION/TRAINING PROGRAM
Payer: MEDICARE

## 2024-03-27 DIAGNOSIS — I07.1 TRICUSPID VALVE INSUFFICIENCY, UNSPECIFIED ETIOLOGY: ICD-10-CM

## 2024-03-27 LAB — LV EJECT FRACT  99904: 65

## 2024-03-27 PROCEDURE — 93306 TTE W/DOPPLER COMPLETE: CPT | Mod: 26 | Performed by: INTERNAL MEDICINE

## 2024-03-27 PROCEDURE — 93306 TTE W/DOPPLER COMPLETE: CPT

## 2024-04-23 ENCOUNTER — OFFICE VISIT (OUTPATIENT)
Dept: MEDICAL GROUP | Facility: PHYSICIAN GROUP | Age: 74
End: 2024-04-23
Payer: MEDICARE

## 2024-04-23 VITALS
HEIGHT: 66 IN | HEART RATE: 72 BPM | SYSTOLIC BLOOD PRESSURE: 116 MMHG | BODY MASS INDEX: 39.39 KG/M2 | WEIGHT: 245.1 LBS | OXYGEN SATURATION: 96 % | TEMPERATURE: 98.4 F | DIASTOLIC BLOOD PRESSURE: 70 MMHG

## 2024-04-23 DIAGNOSIS — G35 MULTIPLE SCLEROSIS (HCC): ICD-10-CM

## 2024-04-23 DIAGNOSIS — M15.9 PRIMARY OSTEOARTHRITIS INVOLVING MULTIPLE JOINTS: ICD-10-CM

## 2024-04-23 DIAGNOSIS — N18.31 STAGE 3A CHRONIC KIDNEY DISEASE (CKD): ICD-10-CM

## 2024-04-23 DIAGNOSIS — D80.1 HYPOGAMMAGLOBULINEMIA (HCC): ICD-10-CM

## 2024-04-23 DIAGNOSIS — I70.0 ATHEROSCLEROSIS OF AORTA (HCC): ICD-10-CM

## 2024-04-23 DIAGNOSIS — F13.20 SEDATIVE, HYPNOTIC OR ANXIOLYTIC DEPENDENCE, UNCOMPLICATED (HCC): ICD-10-CM

## 2024-04-23 PROBLEM — M15.0 PRIMARY OSTEOARTHRITIS INVOLVING MULTIPLE JOINTS: Status: ACTIVE | Noted: 2024-04-23

## 2024-04-23 PROBLEM — E66.01 MORBID OBESITY WITH BMI OF 40.0-44.9, ADULT (HCC): Status: RESOLVED | Noted: 2023-12-12 | Resolved: 2024-04-23

## 2024-04-23 PROCEDURE — 3074F SYST BP LT 130 MM HG: CPT | Performed by: STUDENT IN AN ORGANIZED HEALTH CARE EDUCATION/TRAINING PROGRAM

## 2024-04-23 PROCEDURE — 99214 OFFICE O/P EST MOD 30 MIN: CPT | Performed by: STUDENT IN AN ORGANIZED HEALTH CARE EDUCATION/TRAINING PROGRAM

## 2024-04-23 PROCEDURE — 3078F DIAST BP <80 MM HG: CPT | Performed by: STUDENT IN AN ORGANIZED HEALTH CARE EDUCATION/TRAINING PROGRAM

## 2024-04-23 ASSESSMENT — ENCOUNTER SYMPTOMS
PALPITATIONS: 0
BRUISES/BLEEDS EASILY: 0
DOUBLE VISION: 0
CLAUDICATION: 0
SHORTNESS OF BREATH: 0
HALLUCINATIONS: 0
POLYDIPSIA: 0
SENSORY CHANGE: 0
EYE PAIN: 0
FALLS: 0
BLOOD IN STOOL: 0
NAUSEA: 0
MYALGIAS: 0
SEIZURES: 0
EYE DISCHARGE: 0
SPEECH CHANGE: 0
ABDOMINAL PAIN: 0
SPUTUM PRODUCTION: 0
FOCAL WEAKNESS: 0
DEPRESSION: 0
BLURRED VISION: 0
HEARTBURN: 0
FLANK PAIN: 0
DIAPHORESIS: 0
DIARRHEA: 0
NECK PAIN: 0
WEIGHT LOSS: 0
TINGLING: 0
WHEEZING: 0
ORTHOPNEA: 0
PND: 0
CHILLS: 0
WEAKNESS: 0
FEVER: 0
HEADACHES: 0
LOSS OF CONSCIOUSNESS: 0
CONSTIPATION: 0
VOMITING: 0
COUGH: 0
HEMOPTYSIS: 0
DIZZINESS: 0

## 2024-04-23 ASSESSMENT — LIFESTYLE VARIABLES: SUBSTANCE_ABUSE: 0

## 2024-04-23 NOTE — PROGRESS NOTES
CC:  Diagnoses of Sedative, hypnotic or anxiolytic dependence, uncomplicated (HCC), Atherosclerosis of aorta (HCC), Stage 3a chronic kidney disease (CKD), Hypogammaglobulinemia (HCC), Multiple sclerosis (HCC), Primary osteoarthritis involving multiple joints, and BMI 39.0-39.9,adult were pertinent to this visit.    HISTORY OF THE PRESENT ILLNESS: Patient is a 73 y.o. female. This pleasant patient is here today for follow up visit.    Patient states that she has tried Rybelsus 0.3 mg daily which she tolerated well and it costed her $1000 per month even after coupon. Now she is getting semaglutide through compounding pharmacy costing her $200 per month. However, now she does not lose weight.  Problem   Sedative, Hypnotic Or Anxiolytic Dependence, Uncomplicated (Hcc)   Primary Osteoarthritis Involving Multiple Joints    Has osteoarthritis, tried taichi in the past and helped but does not want to do taichi anymore     Bmi 40.0-44.9, Adult (Hcc) (Resolved)   Morbid Obesity With Bmi of 40.0-44.9, Adult (Hcc) (Resolved)       Pertinent PMH (and specialists following):  Prediabetes, igg deficiency, ckd stage 3a, osteoarthritis  -[place] encephalitis  -transverse myelitis  -breast cancer s/p lumpectomy and radiation in 2013.  No one in family has breast cancer.  -history of endometrial cancer s/p hysterectomy and bilateral oopherectomy.  -multiple sclerosis on rituxan once  a year  -HTN  -hypothyroidism  Quit smoking 46 years ago, rarely alcohol, no drugs.    Health Maintenance: Completed  Last colonsocopy was 2019, had one benign polyp and no need for surveilance.  Worked as a  for her 's company. Has been  for 53 years. Has 2 kids. Has grandchildren  Has a daughter in [employer], master of [employer]; she has two kids. Her  is squeeze in [place]. Son is a .  Does crafts.    Past Medical History:   Diagnosis Date    BMI 40.0-44.9, adult (HCC) 12/19/2023    Morbid obesity with BMI of  40.0-44.9, adult ([facility]) 12/12/2023    Right trigeminal neuralgia 07/30/2013       Current Outpatient Medications   Medication Sig Dispense Refill    Semaglutide 7 MG Tab Take 1 Tablet by mouth every day.      gabapentin (NEURONTIN) 100 MG Cap Take 3 Capsules by mouth at bedtime. 300 Capsule 2    ROPINIRole (REQUIP) 1 MG Tab Take 1 Tablet by mouth 4 times a day. 400 Tablet 2    omeprazole (PRILOSEC) 20 MG delayed-release capsule Take 1 Capsule by mouth every day. 100 Capsule 2    rosuvastatin (CRESTOR) 10 MG Tab Take 1 Tablet by mouth every evening. 100 Tablet 2    levothyroxine (SYNTHROID) 88 MCG Tab Take 1 Tablet by mouth every morning on an empty stomach. 100 Tablet 2    losartan-hydrochlorothiazide (HYZAAR) 50-12.5 MG per tablet Take 2 Tablets by mouth every day. 200 Tablet 2    FLUoxetine (PROZAC) 10 MG Cap Take 10 mg by mouth every day.       No current facility-administered medications for this visit.       Allergies as of 04/23/2024 - Reviewed 04/23/2024   Allergen Reaction Noted    Shellfish allergy Rash 06/13/2019    Lisinopril  06/19/2019       Social History     Socioeconomic History    Marital status:      Spouse name: Not on file    Number of children: Not on file    Years of education: Not on file    Highest education level: Not on file   Occupational History    Not on file   Tobacco Use    Smoking status: Never    Smokeless tobacco: Never   Vaping Use    Vaping Use: Never used   Substance and Sexual Activity    Alcohol use: Not Currently     Comment: occ    Drug use: Never    Sexual activity: Not on file   Other Topics Concern    Not on file   Social History Narrative    Not on file     Social Determinants of Health     Financial Resource Strain: Not on file   Food Insecurity: Not on file   Transportation Needs: Not on file   Physical Activity: Not on file   Stress: Not on file   Social Connections: Not on file   Intimate Partner Violence: Not on file   Housing Stability: Not on file       No  "family history on file.  Denies pertinent family history    Past Surgical History:   Procedure Laterality Date    MO INJECTION,SACROILIAC JOINT Right 1/24/2024    Procedure: RIGHT sacroiliac joint injection with fluoroscopic guidance;  Surgeon: Lamont Vinson M.D.;  [facility];  Service: Pain Management     Had lumpectomy    ROS:   Review of Systems   Constitutional:  Negative for chills, diaphoresis, fever and weight loss.   HENT:  Positive for hearing loss. Negative for ear discharge, ear pain and tinnitus.    Eyes:  Negative for blurred vision, double vision, pain and discharge.   Respiratory:  Negative for cough, hemoptysis, sputum production, shortness of breath and wheezing.    Cardiovascular:  Negative for chest pain, palpitations, orthopnea, claudication, leg swelling and PND.   Gastrointestinal:  Negative for abdominal pain, blood in stool, constipation, diarrhea, heartburn, melena, nausea and vomiting.   Genitourinary:  Negative for dysuria, flank pain, hematuria and urgency.   Musculoskeletal:  Positive for joint pain. Negative for falls, myalgias and neck pain.   Skin:  Negative for itching and rash.   Neurological:  Negative for dizziness, tingling, sensory change, speech change, focal weakness, seizures, loss of consciousness, weakness and headaches.   Endo/Heme/Allergies:  Negative for polydipsia. Does not bruise/bleed easily.   Psychiatric/Behavioral:  Negative for depression, hallucinations, substance abuse and suicidal ideas.        /70 (BP Location: Left arm, Patient Position: Sitting, BP Cuff Size: Adult)   Pulse 72   Temp 36.9 °C (98.4 °F) (Temporal)   Ht 1.676 m (5' 6\")   Wt 111 kg (245 lb 1.6 oz)   SpO2 96%   BMI 39.56 kg/m² Body mass index is 39.56 kg/m².    Physical Exam  Vitals reviewed.   Constitutional:       General: She is not in acute distress.     Appearance: She is not ill-appearing or diaphoretic.   HENT:      Head: Normocephalic and " atraumatic.      Right Ear: External ear normal.      Left Ear: External ear normal.      Nose: Nose normal. No rhinorrhea.      Mouth/Throat:      Pharynx: No oropharyngeal exudate.   Eyes:      General: No scleral icterus.        Right eye: No discharge.         Left eye: No discharge.      Extraocular Movements: Extraocular movements intact.   Cardiovascular:      Rate and Rhythm: Normal rate and regular rhythm.      Heart sounds: Normal heart sounds. No murmur heard.  Pulmonary:      Effort: Pulmonary effort is normal. No respiratory distress.      Breath sounds: Normal breath sounds. No stridor. No wheezing, rhonchi or rales.   Abdominal:      General: There is no distension.      Palpations: Abdomen is soft. There is no mass.      Tenderness: There is no abdominal tenderness. There is no guarding or rebound.   Musculoskeletal:         General: No tenderness. Normal range of motion.      Cervical back: Normal range of motion and neck supple. No rigidity or tenderness.      Right lower leg: No edema.      Left lower leg: No edema.   Lymphadenopathy:      Cervical: No cervical adenopathy.   Skin:     Capillary Refill: Capillary refill takes less than 2 seconds.      Findings: No bruising, erythema, lesion or rash.   Neurological:      General: No focal deficit present.      Mental Status: She is alert and oriented to person, place, and time. Mental status is at baseline.      Cranial Nerves: No cranial nerve deficit.      Sensory: No sensory deficit.      Motor: No weakness.      Deep Tendon Reflexes: Reflexes normal.   Psychiatric:         Mood and Affect: Mood normal.         Behavior: Behavior normal.         Thought Content: Thought content normal.         Judgment: Judgment normal.             Note: I have reviewed all pertinent labs and diagnostic tests associated with this visit with specific comments listed under the assessment and plan below    Assessment and Plan  73 y.o. female with the following  -    Patient states that she has tried Rybelsus 0.3 mg daily which she tolerated well and it costed her $1000 per month even after coupon. Now she is getting semaglutide through compounding pharmacy costing her $200 per month. However, now she does not lose weight anymore. She was able to lose weight well on rybelsus 0.3 mg daily. She would like to continue try taking semaglutide, so I told her to ask compounding pharmacy to increase the dose of semaglutide; if this does not work out, patient would like to get Rybelsus out-of-pocket from [facility] pharmacy    HCC Gap Form    Diagnosis to address: I70.0 - Atherosclerosis of aorta (HCC)  Assessment and plan: Chronic, stable. Continue with current defined treatment plan: continue crestor 10 mg daily. Follow-up at least annually.  Diagnosis: N18.31 - Stage 3a chronic kidney disease (CKD)  Assessment and plan: Chronic, stable. Continue with current defined treatment plan: continue monitoring with metabolic panel. Follow-up at least annually.  Diagnosis: D80.1 - Hypogammaglobulinemia (HCC)  Assessment and plan: Chronic, stable. Continue with current defined treatment plan: no sign of infection, will repeat immunoglobulins in the future. Follow-up at least annually.  Diagnosis: G35 - Multiple sclerosis (HCC)  Assessment and plan: Chronic, stable. Continue with current defined treatment plan: continue following with neurology. Follow-up at least annually.  Diagnosis: F13.20 - Sedative, hypnotic or anxiolytic dependence, uncomplicated (HCC)  Assessment and plan: Chronic, stable. Continue with current defined treatment plan: continue fluoxetine and gabapentin. Follow-up at least annually.  Last edited 04/23/24 08:50 PDT by Xander Maldonado M.D.       1. Sedative, hypnotic or anxiolytic dependence, uncomplicated (HCC)        2. Atherosclerosis of aorta (HCC)        3. Stage 3a chronic kidney disease (CKD)        4. Hypogammaglobulinemia (HCC)        5. Multiple sclerosis (HCC)        6.  Primary osteoarthritis involving multiple joints        7. BMI 39.0-39.9,adult          Problem   Sedative, Hypnotic Or Anxiolytic Dependence, Uncomplicated (Hcc)   Primary Osteoarthritis Involving Multiple Joints    Has osteoarthritis, tried taichi in the past and helped but does not want to do taichi anymore     Bmi 40.0-44.9, Adult (Hcc) (Resolved)   Morbid Obesity With Bmi of 40.0-44.9, Adult (Hcc) (Resolved)         Gave instructions as below:   -ask to increase the dose of compounding pharmacy formulation of semaglutide. If that does not work, let me know and I will prescribe rybelsus 3 mg to [facility] pharmacy.  -mix cinnamon powder with water (1 small spoon) no more than 3 times a day, drink decaffeinated coffee, any forms of green tea are good . Drink a lot of water throughout the day. Exercise. Eat a lot of vegetables and fruits (tomatoes,blueberries,apple ,etc), bananas are higher calorie but one or two a day is fine.    I spent a total of  27 minutes with record review, exam, communication with the patient, communication with other providers, and documentation of this encounter.    Return in about 6 months (around 10/23/2024).    Please note that this dictation was created using voice recognition software. I have made every reasonable attempt to correct obvious errors, but I expect that there are errors of grammar and possibly content that I did not discover before finalizing the note.

## 2024-04-23 NOTE — PATIENT INSTRUCTIONS
-ask to increase the dose of compounding pharmacy formulation of semaglutide. If that does not work, let me know and I will prescribe rybelsus 3 mg to [facility] pharmacy.  -mix cinnamon powder with water (1 small spoon) no more than 3 times a day, drink decaffeinated coffee, any forms of green tea are good . Drink a lot of water throughout the day. Exercise. Eat a lot of vegetables and fruits (tomatoes,blueberries,apple ,etc), bananas are higher calorie but one or two a day is fine.

## 2024-04-29 RX ORDER — GABAPENTIN 100 MG/1
300 CAPSULE ORAL
Qty: 300 CAPSULE | Refills: 2 | Status: SHIPPED | OUTPATIENT
Start: 2024-04-29

## 2024-05-07 ENCOUNTER — PATIENT MESSAGE (OUTPATIENT)
Dept: MEDICAL GROUP | Facility: PHYSICIAN GROUP | Age: 74
End: 2024-05-07
Payer: MEDICARE

## 2024-05-08 ENCOUNTER — APPOINTMENT (OUTPATIENT)
Dept: PHYSICAL THERAPY | Facility: MEDICAL CENTER | Age: 74
End: 2024-05-08
Attending: PHYSICAL MEDICINE & REHABILITATION
Payer: MEDICARE

## 2024-05-08 RX ORDER — FLUOXETINE 10 MG/1
10 CAPSULE ORAL DAILY
Qty: 100 CAPSULE | Refills: 3 | Status: SHIPPED | OUTPATIENT
Start: 2024-05-08

## 2024-05-08 NOTE — PATIENT COMMUNICATION
Received request via: Patient    Was the patient seen in the last year in this department? Yes    Does the patient have an active prescription (recently filled or refills available) for medication(s) requested? No    Pharm[facility]    Does the patient have halfway Plus and need 100 day supply (blood pressure, diabetes and cholesterol meds only)? Yes, quantity updated to 100 days

## 2024-05-14 NOTE — OP THERAPY EVALUATION
Outpatient Physical Therapy  INITIAL EVALUATION    [facility]  [address]  [place] [address]  Phone:  [phone]  Fax:  [phone]    Date of Evaluation: 05/15/2024    Patient: Lisa Chand  YOB: 1950  MRN: [id number]     Referring Provider: Lamont Vinson M.D.  [address]  [address]  [place] [address]   Referring Diagnosis No admission diagnoses are documented for this encounter.     Time Calculation  Start time: 1015  Stop time: 1058 Time Calculation (min): 43 minutes         Chief Complaint: Hip Problem, Back Problem, and Extremity Weakness    Visit Diagnoses     ICD-10-CM   1. Sacroiliac joint dysfunction of right side  M53.3   2. Chronic right-sided low back pain without sciatica  M54.50    G89.29   3. Lumbar spondylosis  M47.816   4. Chronic pain of left knee  M25.562    G89.29       Date of onset of impairment: No data found    Subjective:   History of Present Illness:     Mechanism of injury:  Pt is a 73 y.o female presenting to physical therapy with complaints of chronic R sided low back pain. Pt has a PMH of morbid obesity, arthritis of L SIJ, HTN, breast cancer, stage 3 CKD, and osteopenia and MS. Pt reports that she received an SIJ injection a few months back with Dr. Vinson which initially helped her back pain a lot, but notes that her pain is starting to return. Notes that most of her pain occurs when she is standing/walking for too long. Pt notes that she is fairly sedentary throughout the day, knows she needs to get more active and loose weight for her body to feel better. Reports she does have MS, last flare up was 2 year ago which was very sudden and unexpected. Has been undergoing infusion therapy for this and has been feeling better in a fatigue sense. Pt also reports that her balance is troublesome occasionally, when she feels off balance or unstable, tends to hold onto her   "for assurance. Also reports her L knee can dislocate when she goes to stand from sitting (2-3x per day).    Per MD on 03/05/2024:\"1/24/2024 right sacroiliac joint injection 100% improvement during the diagnostic phase.  60% improvement at the follow-up visit. The patient overall had excellent improvement in pain and function after the sacroiliac joint injection.  She has improved standing and sitting tolerance.  Improved function and ADLs.  Pain is currently 2 out of 10 in intensity NRS.  Denies radiating pain.\"    Pt denies changes in bowel/bladder movements, saddle anesthesia, significant weight changes, chills/night sweats/fever, nausea and vomiting. Pt consents to evaluation and treatment today.    Pain:     Pain Comments::  Quality: stiff, dull-ache    Aggravating Factors: walking (5-10 minutes IND without walking), standing (5 minutes), lying down (needs assistance), waking up in the morning     Relieving Factors: sitting  Activities of Daily Living:     Patient reported ADL status: Pt reported ADLs: IND, shower chair is used when showering but able to shower IND, mod IND with bed mobility with railing  Work: retired   Exercise: walking up the driveway 2 laps (last two days)   Hobbies: gardening, sewing, painting,     Assistive Devices:  Rolator - uses during walking , arm rails in the bed  Patient Goals:     Patient goals for therapy:  Increased motion, decreased pain and increased strength    Other patient goals:  Improve physical activity, increase exercise routine, \"get moving and lose weight\"      Past Medical History:   Diagnosis Date    BMI 40.0-44.9, adult ([facility]) 12/19/2023    Morbid obesity with BMI of 40.0-44.9, adult ([facility]) 12/12/2023    Right trigeminal neuralgia 07/30/2013     Past Surgical History:   Procedure Laterality Date    SC INJECTION,SACROILIAC JOINT Right 1/24/2024    Procedure: RIGHT sacroiliac joint injection with fluoroscopic guidance;  Surgeon: Lamont Vinson M.D.;  Location[facility];  Service: Pain Management     Social History     Tobacco Use    Smoking status: Never    Smokeless tobacco: Never   Substance Use Topics    Alcohol use: Not Currently     Comment: occ     Family and Occupational History     Socioeconomic History    Marital status:      Spouse name: Not on file    Number of children: Not on file    Years of education: Not on file    Highest education level: Not on file   Occupational History    Not on file       Objective     Hip Screen   Hip range of motion within functional limits with the following exceptions: Notes increased discomfort in R low back/SIJ with R hip PROM into ER    Active Range of Motion     Lumbar   Flexion: within functional limits  Extension: decreased (pain in R low back/SIJ)  Left lateral flexion: decreased  Right lateral flexion: decreased  Left rotation: decreased  Right rotation: decreased (pain in R low back/SIJ)    Strength:      Left Hip   Planes of Motion   Flexion: 3+  Extension: 4-  Abduction: 3  Adduction: 3    Right Hip   Planes of Motion   Flexion: 3+  Extension: 4-  Abduction: 3  Adduction: 3    Left Knee   Flexion: 3+  Extension: 4    Right Knee   Flexion: 4-  Extension: 4    Left Ankle/Foot   Dorsiflexion: 4  Plantar flexion: 4    Right Ankle/Foot   Dorsiflexion: 4  Plantar flexion: 4    Additional Strength Details  Bridge: pt obtains 1/3 ROM off mat table, severe pelvic instability noted during motion, however does not report an increase in low back pain    5x STS: 18 seconds, requires use of HHA on knees for last 2 repetitions, very poor eccentric control on descent and heavy fwd momentum required in order to stand     Gait: pt ambulates in clinic today with no AD, short stride length bilaterally, decreased miguel, and appears fatigued from walk up to clinic from parking lot. Notes she can walk farther now with use of AD.          Therapeutic Exercises (CPT 62465):     1. pt education, exam findings:  deconditioning, weakness, body habitus, POC    2. increase walking endurance/laps at home, 2 laps with rolator for 4 days, increase to 3 laps if tolerates well . Record on watch to determine average HR, distance covered, time spent during walking    3. STS, x5, added to hep    4. seated marching, x5 ea, added to hep      Therapeutic Exercise Summary: Pt performed these exercises with instruction and SPV.  Provided handout of these exercises for daily HEP.     Access Code: [email or web address]  URL: [email or web address]  Date: 05/15/2024  Prepared by: Alecia Godinez    Exercises  - Sit to Stand Without Arm Support  - 1 x daily - 4 x weekly - 2 sets - 10 reps  - Seated Hip Flexion March with Ankle Weights  - 1 x daily - 4 x weekly - 2 sets - 10 reps      Time-based treatments/modalities:    Physical Therapy Timed Treatment Charges  Therapeutic exercise minutes (CPT 66414): 8 minutes      Assessment, Response and Plan:   Impairments: abnormal gait, abnormal or restricted ROM, activity intolerance, impaired balance, impaired physical strength, lacks appropriate home exercise program, limited ADL's, limited mobility and pain with function    Assessment details:  Pt is a 73 y.o female presenting to physical therapy with complaints of chronic R sided low back pain. Pt has a PMH of morbid obesity, arthritis of L SIJ, HTN, breast cancer, stage 3 CKD, and osteopenia. Pt presents today with chronic low back and R SIJ pain with the following impairments: decreased l/s AROM with associated pain with R rotation and extension, generalized total body weakness, abnormal gait pattern, decreased activity tolerance (walking >5-10 minutes, standing >5 minutes), and poor cardiovascular endurance. Pt admits to a very sedentary lifestyle, but appears motivated to increased level of physical activity and increase weekly exercise routine in order to reduce body habitus.     Pt educated on exam findings and future POC, pt acknowledged  understanding and is agreeable. Pt will benefit from skilled physical therapy to address the following impairments in order to improve functional mobility and overall QOL.  Pt should progress well towards goals if compliant with HEP and POC.     Goals:   Short Term Goals:   1. Pt will report independence and compliance with written HEP  2. Pt will demo improved l/s AROM in standing to WFL in order to improve functional mobility  3. Pt will report a gradual increase in ambulation distance at home with LRAD from 2 laps to >/= to 3 laps   4. Pt will demo x10 repetitions of STS with good eccentric control and no HHA from standard chair height in order to improve tolerance with transfers.   Short term goal time span:  2-4 weeks      Long Term Goals:    1. Pt will report independence and compliance with written HEP   2.Pt will perform 5x STS test with no HHA and good eccentric control in </= to 15 seconds in order to improve ease and safety with transfers  3. Pt will report an overall increase in walking tolerance to >/= to 30 minutes with LRAD in order to increase cardiovascular endurance   4. Pt will demo improve BLE global strength to >/= to 4/5 in order to improve functional mobility   5. Pt will have a significant improvement in RMQ with MCID of >/= to 5 points (eval: 79.17)    Long term goal time span:  6-8 weeks    Plan:   Therapy options:  Physical therapy treatment to continue  Planned therapy interventions:  Neuromuscular Re-education (CPT 17725), Gait Training (CPT 37361), Therapeutic Activities (CPT 67975) and Therapeutic Exercise (CPT 46436)  Frequency: 1-2x per week.  Duration in weeks:  8  Discussed with:  Patient  Plan details:  07/05/2024      Functional Assessment Used  Xu Iván Low Back Pain and Disability Score: 79.17     Referring provider co-signature:  I have reviewed this plan of care and my co-signature certifies the need for services.    Certification Period: 05/15/2024 to   07/12/2024    Physician Signature: ________________________________ Date: ______________

## 2024-05-15 ENCOUNTER — PHYSICAL THERAPY (OUTPATIENT)
Dept: PHYSICAL THERAPY | Facility: MEDICAL CENTER | Age: 74
End: 2024-05-15
Attending: STUDENT IN AN ORGANIZED HEALTH CARE EDUCATION/TRAINING PROGRAM
Payer: MEDICARE

## 2024-05-15 DIAGNOSIS — M53.3 SACROILIAC JOINT DYSFUNCTION OF RIGHT SIDE: ICD-10-CM

## 2024-05-15 DIAGNOSIS — M25.562 CHRONIC PAIN OF LEFT KNEE: ICD-10-CM

## 2024-05-15 DIAGNOSIS — M54.50 CHRONIC RIGHT-SIDED LOW BACK PAIN WITHOUT SCIATICA: ICD-10-CM

## 2024-05-15 DIAGNOSIS — G89.29 CHRONIC RIGHT-SIDED LOW BACK PAIN WITHOUT SCIATICA: ICD-10-CM

## 2024-05-15 DIAGNOSIS — M47.816 LUMBAR SPONDYLOSIS: ICD-10-CM

## 2024-05-15 DIAGNOSIS — G89.29 CHRONIC PAIN OF LEFT KNEE: ICD-10-CM

## 2024-05-15 ASSESSMENT — ACTIVITIES OF DAILY LIVING (ADL): POOR_BALANCE: 1

## 2024-05-21 NOTE — OP THERAPY DAILY TREATMENT
Outpatient Physical Therapy  DAILY TREATMENT     [facility] Outpatient Physical Therapy  [address]  [place] [address]  Phone:  [phone]  Fax:  [phone]    Date: 05/22/2024    Patient: Lisa Chand  YOB: 1950  MRN: [id number]     Time Calculation    Start time: 1100  Stop time: 1144 Time Calculation (min): 44 minutes         Chief Complaint: Back Problem, Hip Problem, and Extremity Weakness    Visit #: 2    SUBJECTIVE:  Pt states that she has not had no    OBJECTIVE:  Current objective measures:           Therapeutic Exercises (CPT 66039):     1. NuStep, x6 minutes, cardiovascular warm up    2. increase walking endurance/laps at home, 2 laps with rolator for 4 days, increase to 3 laps if tolerates well . Record on watch to determine average HR, distance covered, time spent during walking    3. STS, 2x 5, hep    4. seated marching, 3x 8 ea OTB, hep    5. LTR, x10 ea, hep    6. figure 4 stretch, 2x 1 min, hep    7. ASLR, 2x 8, hep    8. bridge, 2x 8      Therapeutic Exercise Summary: Pt performed these exercises with instruction and SPV.  Provided handout of these exercises for daily HEP.     Access Code: [email or web address]  URL: [email or web address]  Date: 05/15/2024  Prepared by: Alecia Godinez    Exercises  - Sit to Stand Without Arm Support  - 1 x daily - 4 x weekly - 2 sets - 10 reps  - Seated Hip Flexion March with Ankle Weights  - 1 x daily - 4 x weekly - 2 sets - 10 reps      Time-based treatments/modalities:    Physical Therapy Timed Treatment Charges  Therapeutic exercise minutes (CPT 50866): 44 minutes    ASSESSMENT:   Response to treatment: Pt tolerated today's treatment session well with no increase in s/s but appropriate fatigue at end of session. Addition of l/s and hip mobility focused stretching today in order to ease stiffness reported after working in the garden. Pt with good tolerance with all additional strengthening today  with appropriate muscle fatigue at end of session with mindfulness of not over fatiguing pt due to history and dx of MS. Will continue to benefit from progressive stretching/strengthening as tolerated.     PLAN/RECOMMENDATIONS:   Plan for treatment: therapy treatment to continue next visit.  Planned interventions for next visit: continue with current treatment.

## 2024-05-22 ENCOUNTER — PHYSICAL THERAPY (OUTPATIENT)
Dept: PHYSICAL THERAPY | Facility: MEDICAL CENTER | Age: 74
End: 2024-05-22
Attending: STUDENT IN AN ORGANIZED HEALTH CARE EDUCATION/TRAINING PROGRAM
Payer: MEDICARE

## 2024-05-22 DIAGNOSIS — M47.816 LUMBAR SPONDYLOSIS: ICD-10-CM

## 2024-05-22 DIAGNOSIS — M54.50 CHRONIC RIGHT-SIDED LOW BACK PAIN WITHOUT SCIATICA: ICD-10-CM

## 2024-05-22 DIAGNOSIS — G89.29 CHRONIC RIGHT-SIDED LOW BACK PAIN WITHOUT SCIATICA: ICD-10-CM

## 2024-05-22 DIAGNOSIS — G89.29 CHRONIC PAIN OF LEFT KNEE: ICD-10-CM

## 2024-05-22 DIAGNOSIS — M53.3 SACROILIAC JOINT DYSFUNCTION OF RIGHT SIDE: ICD-10-CM

## 2024-05-22 DIAGNOSIS — M25.562 CHRONIC PAIN OF LEFT KNEE: ICD-10-CM

## 2024-05-28 NOTE — OP THERAPY DAILY TREATMENT
Outpatient Physical Therapy  DAILY TREATMENT     [facility] Outpatient Physical Therapy  [address]  [place] [address]  Phone:  [phone]  Fax:  [phone]    Date: 05/29/2024    Patient: Lisa Chand  YOB: 1950  MRN: [id number]     Time Calculation    Start time: 1015  Stop time: 1055 Time Calculation (min): 40 minutes         Chief Complaint: Back Problem and Knee Problem    Visit #: 3    SUBJECTIVE:  Pt states that she has been out in the garden walking more without the walker which.     OBJECTIVE:  Current objective measures:           Therapeutic Exercises (CPT 52080):     1. NuStep, x6 minutes, cardiovascular warm up    2. increase walking endurance/laps at home, 2 laps with rolator for 4 days, increase to 3 laps if tolerates well . Record on watch to determine average HR, distance covered, time spent during walking    3. STS, 2x 5, hep    4. seated marching, 3x 8 ea OTB, hep    5. LTR, x10 ea, hep    6. figure 4 stretch, 2x 1 min, hep    7. ASLR, 2x 8, hep    8. bridge, 2x 8    9. leg press, 3x 10 5c      Therapeutic Exercise Summary: Pt performed these exercises with instruction and SPV.  Provided handout of these exercises for daily HEP.     Access Code: [email or web address]  URL: [email or web address]  Date: 05/15/2024  Prepared by: Alecia Godinez    Exercises  - Sit to Stand Without Arm Support  - 1 x daily - 4 x weekly - 2 sets - 10 reps  - Seated Hip Flexion March with Ankle Weights  - 1 x daily - 4 x weekly - 2 sets - 10 reps      Time-based treatments/modalities:    Physical Therapy Timed Treatment Charges  Therapeutic exercise minutes (CPT 86575): 40 minutes    ASSESSMENT:   Response to treatment: Pt tolerated today's treatment session well with no increase in s/s but appropriate fatigue at end of session. Addition of leg press was tolerated well today with no increase in knee or low back discomfort. Pt with good tolerance with all  additional strengthening today with appropriate muscle fatigue at end of session with mindfulness of not over fatiguing pt due to history and dx of MS. Will continue to benefit from progressive stretching/strengthening as tolerated.     PLAN/RECOMMENDATIONS:   Plan for treatment: therapy treatment to continue next visit.  Planned interventions for next visit: continue with current treatment.

## 2024-05-29 ENCOUNTER — PHYSICAL THERAPY (OUTPATIENT)
Dept: PHYSICAL THERAPY | Facility: MEDICAL CENTER | Age: 74
End: 2024-05-29
Attending: STUDENT IN AN ORGANIZED HEALTH CARE EDUCATION/TRAINING PROGRAM
Payer: MEDICARE

## 2024-05-29 DIAGNOSIS — M53.3 SACROILIAC JOINT DYSFUNCTION OF RIGHT SIDE: ICD-10-CM

## 2024-05-29 DIAGNOSIS — G89.29 CHRONIC RIGHT-SIDED LOW BACK PAIN WITHOUT SCIATICA: ICD-10-CM

## 2024-05-29 DIAGNOSIS — M25.562 CHRONIC PAIN OF LEFT KNEE: ICD-10-CM

## 2024-05-29 DIAGNOSIS — M47.816 LUMBAR SPONDYLOSIS: ICD-10-CM

## 2024-05-29 DIAGNOSIS — M54.50 CHRONIC RIGHT-SIDED LOW BACK PAIN WITHOUT SCIATICA: ICD-10-CM

## 2024-05-29 DIAGNOSIS — G89.29 CHRONIC PAIN OF LEFT KNEE: ICD-10-CM

## 2024-06-04 NOTE — OP THERAPY DAILY TREATMENT
Outpatient Physical Therapy  DAILY TREATMENT     [facility] Outpatient Physical Therapy  [address]  [place] [address]  Phone:  [phone]  Fax:  [phone]    Date: 06/05/2024    Patient: Lisa Chand  YOB: 1950  MRN: [id number]     Time Calculation                   Chief Complaint: No chief complaint on file.    Visit #: 4    SUBJECTIVE:  Pt states    OBJECTIVE:  Current objective measures:           Therapeutic Exercises (CPT 75412):     1. NuStep, x6 minutes, cardiovascular warm up    2. increase walking endurance/laps at home, 2 laps with rolator for 4 days, increase to 3 laps if tolerates well . Record on watch to determine average HR, distance covered, time spent during walking    3. STS, 2x 5, hep    4. seated marching, 3x 8 ea OTB, hep    5. LTR, x10 ea, hep    6. figure 4 stretch, 2x 1 min, hep    7. ASLR, 2x 8, hep    8. bridge, 2x 8    9. leg press, 3x 10 5c      Therapeutic Exercise Summary: Pt performed these exercises with instruction and SPV.  Provided handout of these exercises for daily HEP.     Access Code: [email or web address]  URL: [email or web address]  Date: 05/15/2024  Prepared by: Alecia Godinez    Exercises  - Sit to Stand Without Arm Support  - 1 x daily - 4 x weekly - 2 sets - 10 reps  - Seated Hip Flexion March with Ankle Weights  - 1 x daily - 4 x weekly - 2 sets - 10 reps      Time-based treatments/modalities:           Pain rating (1-10) before treatment:  {PAIN NUMBERS_1-10:81250}  Pain rating (1-10) after treatment:  {PAIN NUMBERS_1-10:14739}    ASSESSMENT:   Response to treatment:     Pt tolerated today's treatment session well with no increase in s/s but appropriate fatigue at end of session. Addition of leg press was tolerated well today with no increase in knee or low back discomfort. Pt with good tolerance with all additional strengthening today with appropriate muscle fatigue at end of session with mindfulness  of not over fatiguing pt due to history and dx of MS. Will continue to benefit from progressive stretching/strengthening as tolerated     PLAN/RECOMMENDATIONS:   Plan for treatment: therapy treatment to continue next visit.  Planned interventions for next visit: continue with current treatment.

## 2024-06-05 ENCOUNTER — PHYSICAL THERAPY (OUTPATIENT)
Dept: PHYSICAL THERAPY | Facility: MEDICAL CENTER | Age: 74
End: 2024-06-05
Attending: STUDENT IN AN ORGANIZED HEALTH CARE EDUCATION/TRAINING PROGRAM
Payer: MEDICARE

## 2024-06-05 DIAGNOSIS — M54.50 CHRONIC RIGHT-SIDED LOW BACK PAIN WITHOUT SCIATICA: ICD-10-CM

## 2024-06-05 DIAGNOSIS — G89.29 CHRONIC PAIN OF LEFT KNEE: ICD-10-CM

## 2024-06-05 DIAGNOSIS — M47.816 LUMBAR SPONDYLOSIS: ICD-10-CM

## 2024-06-05 DIAGNOSIS — M25.562 CHRONIC PAIN OF LEFT KNEE: ICD-10-CM

## 2024-06-05 DIAGNOSIS — M53.3 SACROILIAC JOINT DYSFUNCTION OF RIGHT SIDE: ICD-10-CM

## 2024-06-05 DIAGNOSIS — G89.29 CHRONIC RIGHT-SIDED LOW BACK PAIN WITHOUT SCIATICA: ICD-10-CM

## 2024-06-05 PROCEDURE — 999999 HB NO CHARGE

## 2024-06-05 NOTE — OP THERAPY DAILY TREATMENT
Pt arrived today stating she is feeling very dehydrated, shaky, and unable to exercise. Pt did not want to call and cancel and was hoping she would feel better upon arrival. Offered to take pt vital signs, pt politely refused. Pt AXO x4. Pt notes she needs to return home, drink more fluids, and rest. Therapist advised on adding in electrolytes today to increase hydration and to watch for s/s of severe dehydration. Pt was educated if s/s of severe dehydration occur to admit herself to the ER; pt agreeable. Pt was offered to be escorted out by this therapist to her car, which pt again politely refused.     Will follow up by phone in the next few days to assess pt status.     Alecia Godinez, PT, DPT

## 2024-06-11 NOTE — OP THERAPY DAILY TREATMENT
Outpatient Physical Therapy  DAILY TREATMENT     [facility] Outpatient Physical Therapy  [address]  [place] [address]  Phone:  [phone]  Fax:  [phone]    Date: 06/12/2024    Patient: Lisa Chand  YOB: 1950  MRN: [id number]     Time Calculation    Start time: 1015  Stop time: 1055 Time Calculation (min): 40 minutes         Chief Complaint: Back Problem    Visit #: 4    SUBJECTIVE:  Pt states that she is doing well today. Feels a lot better than last week.     OBJECTIVE:  Current objective measures:           Therapeutic Exercises (CPT 96990):     1. NuStep, x6 minutes, cardiovascular warm up    2. increase walking endurance/laps at home, 2 laps with rolator for 4 days, increase to 3 laps if tolerates well . Record on watch to determine average HR, distance covered, time spent during walking    3. STS, 2x 5, hep    4. seated marching, 3x 8 ea OTB, hep    5. LTR, x10 ea, hep    6. figure 4 stretch, 2x 1 min, hep    7. ASLR, 2x 8, hep    8. bridge, 2x 8    9. leg press, 3x 10 6c      Therapeutic Exercise Summary: Pt performed these exercises with instruction and SPV.  Provided handout of these exercises for daily HEP.     Access Code: [email or web address]  URL: [email or web address]  Date: 05/15/2024  Prepared by: Alecia Godinez    Exercises  - Sit to Stand Without Arm Support  - 1 x daily - 4 x weekly - 2 sets - 10 reps  - Seated Hip Flexion March with Ankle Weights  - 1 x daily - 4 x weekly - 2 sets - 10 reps      Time-based treatments/modalities:    Physical Therapy Timed Treatment Charges  Therapeutic exercise minutes (CPT 87361): 40 minutes    ASSESSMENT:   Response to treatment: Pt tolerated today's treatment session well with no increase in s/s but appropriate fatigue at end of session. Reviewed all there-x today since pt has not been seen in session for 2 weeks. Pt with good tolerance with all additional strengthening today with  appropriate muscle fatigue at end of session with mindfulness of not over fatiguing pt due to history and dx of MS. Will continue to benefit from progressive stretching/strengthening as tolerated. Educated pt today on last session being her last, pt wishes to discharge next session as she feels she has all of the appropriate information to carry on at home.    PLAN/RECOMMENDATIONS:   Plan for treatment: therapy treatment to continue next visit.  Planned interventions for next visit: continue with current treatment.

## 2024-06-12 ENCOUNTER — TELEPHONE (OUTPATIENT)
Dept: HEALTH INFORMATION MANAGEMENT | Facility: OTHER | Age: 74
End: 2024-06-12

## 2024-06-12 ENCOUNTER — PHYSICAL THERAPY (OUTPATIENT)
Dept: PHYSICAL THERAPY | Facility: MEDICAL CENTER | Age: 74
End: 2024-06-12
Attending: STUDENT IN AN ORGANIZED HEALTH CARE EDUCATION/TRAINING PROGRAM
Payer: MEDICARE

## 2024-06-12 DIAGNOSIS — M54.50 CHRONIC RIGHT-SIDED LOW BACK PAIN WITHOUT SCIATICA: ICD-10-CM

## 2024-06-12 DIAGNOSIS — M53.3 SACROILIAC JOINT DYSFUNCTION OF RIGHT SIDE: ICD-10-CM

## 2024-06-12 DIAGNOSIS — G89.29 CHRONIC RIGHT-SIDED LOW BACK PAIN WITHOUT SCIATICA: ICD-10-CM

## 2024-06-12 PROCEDURE — 97110 THERAPEUTIC EXERCISES: CPT

## 2024-06-19 ENCOUNTER — APPOINTMENT (OUTPATIENT)
Dept: PHYSICAL THERAPY | Facility: MEDICAL CENTER | Age: 74
End: 2024-06-19
Attending: STUDENT IN AN ORGANIZED HEALTH CARE EDUCATION/TRAINING PROGRAM
Payer: MEDICARE

## 2024-06-25 ENCOUNTER — OFFICE VISIT (OUTPATIENT)
Dept: URGENT CARE | Facility: CLINIC | Age: 74
End: 2024-06-25
Payer: MEDICARE

## 2024-06-25 ENCOUNTER — APPOINTMENT (OUTPATIENT)
Dept: RADIOLOGY | Facility: IMAGING CENTER | Age: 74
End: 2024-06-25
Attending: REGISTERED NURSE
Payer: MEDICARE

## 2024-06-25 VITALS
WEIGHT: 235 LBS | SYSTOLIC BLOOD PRESSURE: 128 MMHG | RESPIRATION RATE: 19 BRPM | HEART RATE: 78 BPM | DIASTOLIC BLOOD PRESSURE: 82 MMHG | HEIGHT: 66 IN | OXYGEN SATURATION: 98 % | TEMPERATURE: 98 F | BODY MASS INDEX: 37.77 KG/M2

## 2024-06-25 DIAGNOSIS — M79.644 PAIN OF FINGER OF RIGHT HAND: ICD-10-CM

## 2024-06-25 DIAGNOSIS — M15.9 PRIMARY OSTEOARTHRITIS INVOLVING MULTIPLE JOINTS: ICD-10-CM

## 2024-06-25 PROCEDURE — 3074F SYST BP LT 130 MM HG: CPT | Performed by: REGISTERED NURSE

## 2024-06-25 PROCEDURE — 99213 OFFICE O/P EST LOW 20 MIN: CPT | Performed by: REGISTERED NURSE

## 2024-06-25 PROCEDURE — 3079F DIAST BP 80-89 MM HG: CPT | Performed by: REGISTERED NURSE

## 2024-06-25 PROCEDURE — 73140 X-RAY EXAM OF FINGER(S): CPT | Mod: TC,FY,RT | Performed by: REGISTERED NURSE

## 2024-06-25 ASSESSMENT — ENCOUNTER SYMPTOMS
TINGLING: 0
SENSORY CHANGE: 0
FOCAL WEAKNESS: 0

## 2024-06-25 NOTE — PROGRESS NOTES
"Subjective:   Lisa Chand is a 73 y.o. female who presents for Finger Fracture (Fell hurt middle finger rt hand 3 days ago )      HPI  3 days ago when after dog and caught herself on a wall injuring the right middle finger at the PIP joint.  Underlying history of arthritis.  Has not taken any medications for the symptoms but did tape the finger for support.  No numbness or tingling.  Some decreased range of motion secondary to pain.    Review of Systems   Neurological:  Negative for tingling, sensory change and focal weakness.       Medications, Allergies, and current problem list reviewed today in Epic.     Objective:     /82   Pulse 78   Temp 36.7 °C (98 °F) (Temporal)   Resp 19   Ht 1.676 m (5' 6\")   Wt 107 kg (235 lb)   SpO2 98%     Physical Exam  Vitals and nursing note reviewed.   Constitutional:       Appearance: She is not ill-appearing or toxic-appearing.   HENT:      Head: Normocephalic.   Eyes:      Pupils: Pupils are equal, round, and reactive to light.   Cardiovascular:      Rate and Rhythm: Normal rate.   Pulmonary:      Effort: Pulmonary effort is normal.   Musculoskeletal:        Hands:    Neurological:      General: No focal deficit present.      Mental Status: She is alert.   Psychiatric:         Mood and Affect: Mood normal.         RADIOLOGY RESULTS   DX-FINGER(S) 2+ RIGHT    Result Date: 6/25/2024 6/25/2024 11:18 AM HISTORY/REASON FOR EXAM:  Pain/Deformity Following Trauma; right middle finger, PIP joint.. TECHNIQUE/EXAM DESCRIPTION AND NUMBER OF VIEWS:  3 views of the  RIGHT middle finger. COMPARISON: 11/24/2023 FINDINGS: MINERALIZATION: Mineralization is unremarkable for age. INJURY: No acute fracture or gross malalignment is seen. JOINTS: There is redemonstration of interphalangeal joint space narrowing with marginal osteophyte formation. Similar changes are present at the basilar joints of the thumb..     1.  No radiographic evidence of acute traumatic injury. 2.  " Osteoarthritis           Assessment/Plan:     I personally reviewed prior external notes and test results pertinent to today's visit as well as additional imaging and testing completed in clinic today. Shared decision-making was utilized with patient for treatment plan.     1. Pain of finger of right hand  DX-FINGER(S) 2+ RIGHT        Very pleasant 73-year-old with underlying arthritis who sustained an injury to the right middle finger PIP joint.  Neurovascular intact.  There is some slight swelling at the joint.  Did complete an x-ray which is negative for acute fracture there is evidence of osteoarthritis which is baseline.  Did discuss these findings placed her in a finger splint for comfort.  Discussed OTC medications for comfort as well as ice.      Medication discussed included indication for use and the potential benefits and side effects. Education was provided regarding the aforementioned assessments. All of the patient's questions were answered to their satisfaction at the time of discharge. Patient was encouraged to monitor symptoms closely, and we reviewed the signs and symptoms which would warrant concern and mandate seeking a higher level of service through the emergency department. Patient stated agreement and understanding of this plan of care.     Please note that this dictation was created using voice recognition software. I have made every reasonable attempt to correct obvious errors, but I expect that there are errors of grammar and possibly content that I did not discover before finalizing the note.    This note was electronically signed by MAYRA Roberts

## 2024-07-09 ENCOUNTER — OFFICE VISIT (OUTPATIENT)
Dept: MEDICAL GROUP | Age: 74
End: 2024-07-09
Payer: MEDICARE

## 2024-07-09 VITALS
OXYGEN SATURATION: 97 % | TEMPERATURE: 96.6 F | HEART RATE: 75 BPM | BODY MASS INDEX: 37.45 KG/M2 | SYSTOLIC BLOOD PRESSURE: 120 MMHG | WEIGHT: 233 LBS | DIASTOLIC BLOOD PRESSURE: 68 MMHG | HEIGHT: 66 IN

## 2024-07-09 DIAGNOSIS — G35 MULTIPLE SCLEROSIS (HCC): ICD-10-CM

## 2024-07-09 DIAGNOSIS — R73.03 PREDIABETES: ICD-10-CM

## 2024-07-09 DIAGNOSIS — E78.5 DYSLIPIDEMIA: Chronic | ICD-10-CM

## 2024-07-09 DIAGNOSIS — I10 PRIMARY HYPERTENSION: Chronic | ICD-10-CM

## 2024-07-09 DIAGNOSIS — E55.9 VITAMIN D INSUFFICIENCY: ICD-10-CM

## 2024-07-09 PROCEDURE — 3074F SYST BP LT 130 MM HG: CPT | Performed by: STUDENT IN AN ORGANIZED HEALTH CARE EDUCATION/TRAINING PROGRAM

## 2024-07-09 PROCEDURE — 3078F DIAST BP <80 MM HG: CPT | Performed by: STUDENT IN AN ORGANIZED HEALTH CARE EDUCATION/TRAINING PROGRAM

## 2024-07-09 PROCEDURE — 99214 OFFICE O/P EST MOD 30 MIN: CPT | Performed by: STUDENT IN AN ORGANIZED HEALTH CARE EDUCATION/TRAINING PROGRAM

## 2024-07-09 SDOH — ECONOMIC STABILITY: TRANSPORTATION INSECURITY
IN THE PAST 12 MONTHS, HAS THE LACK OF TRANSPORTATION KEPT YOU FROM MEDICAL APPOINTMENTS OR FROM GETTING MEDICATIONS?: NO

## 2024-07-09 SDOH — ECONOMIC STABILITY: HOUSING INSECURITY
IN THE LAST 12 MONTHS, WAS THERE A TIME WHEN YOU DID NOT HAVE A STEADY PLACE TO SLEEP OR SLEPT IN A SHELTER (INCLUDING NOW)?: NO

## 2024-07-09 SDOH — ECONOMIC STABILITY: HOUSING INSECURITY: IN THE LAST 12 MONTHS, HOW MANY PLACES HAVE YOU LIVED?: 2

## 2024-07-09 SDOH — HEALTH STABILITY: PHYSICAL HEALTH: ON AVERAGE, HOW MANY MINUTES DO YOU ENGAGE IN EXERCISE AT THIS LEVEL?: 0 MIN

## 2024-07-09 SDOH — ECONOMIC STABILITY: INCOME INSECURITY: IN THE LAST 12 MONTHS, WAS THERE A TIME WHEN YOU WERE NOT ABLE TO PAY THE MORTGAGE OR RENT ON TIME?: NO

## 2024-07-09 SDOH — ECONOMIC STABILITY: TRANSPORTATION INSECURITY
IN THE PAST 12 MONTHS, HAS LACK OF RELIABLE TRANSPORTATION KEPT YOU FROM MEDICAL APPOINTMENTS, MEETINGS, WORK OR FROM GETTING THINGS NEEDED FOR DAILY LIVING?: NO

## 2024-07-09 SDOH — ECONOMIC STABILITY: FOOD INSECURITY: WITHIN THE PAST 12 MONTHS, YOU WORRIED THAT YOUR FOOD WOULD RUN OUT BEFORE YOU GOT MONEY TO BUY MORE.: NEVER TRUE

## 2024-07-09 SDOH — ECONOMIC STABILITY: INCOME INSECURITY: HOW HARD IS IT FOR YOU TO PAY FOR THE VERY BASICS LIKE FOOD, HOUSING, MEDICAL CARE, AND HEATING?: NOT HARD AT ALL

## 2024-07-09 SDOH — HEALTH STABILITY: PHYSICAL HEALTH: ON AVERAGE, HOW MANY DAYS PER WEEK DO YOU ENGAGE IN MODERATE TO STRENUOUS EXERCISE (LIKE A BRISK WALK)?: 0 DAYS

## 2024-07-09 SDOH — ECONOMIC STABILITY: FOOD INSECURITY: WITHIN THE PAST 12 MONTHS, THE FOOD YOU BOUGHT JUST DIDN'T LAST AND YOU DIDN'T HAVE MONEY TO GET MORE.: NEVER TRUE

## 2024-07-09 SDOH — HEALTH STABILITY: MENTAL HEALTH
STRESS IS WHEN SOMEONE FEELS TENSE, NERVOUS, ANXIOUS, OR CAN'T SLEEP AT NIGHT BECAUSE THEIR MIND IS TROUBLED. HOW STRESSED ARE YOU?: TO SOME EXTENT

## 2024-07-09 SDOH — ECONOMIC STABILITY: TRANSPORTATION INSECURITY
IN THE PAST 12 MONTHS, HAS LACK OF TRANSPORTATION KEPT YOU FROM MEETINGS, WORK, OR FROM GETTING THINGS NEEDED FOR DAILY LIVING?: NO

## 2024-07-09 ASSESSMENT — ENCOUNTER SYMPTOMS
ABDOMINAL PAIN: 0
BACK PAIN: 0
SHORTNESS OF BREATH: 0
BLOOD IN STOOL: 0
PALPITATIONS: 0
DIZZINESS: 0
COUGH: 0
DEPRESSION: 0
CHILLS: 0
HEADACHES: 0
WEAKNESS: 0
DOUBLE VISION: 0
FEVER: 0
BRUISES/BLEEDS EASILY: 0
BLURRED VISION: 0

## 2024-07-09 ASSESSMENT — SOCIAL DETERMINANTS OF HEALTH (SDOH)
IN THE PAST 12 MONTHS, HAS THE ELECTRIC, GAS, OIL, OR WATER COMPANY THREATENED TO SHUT OFF SERVICE IN YOUR HOME?: NO
HOW OFTEN DO YOU ATTENT MEETINGS OF THE CLUB OR ORGANIZATION YOU BELONG TO?: NEVER
HOW MANY DRINKS CONTAINING ALCOHOL DO YOU HAVE ON A TYPICAL DAY WHEN YOU ARE DRINKING: PATIENT DOES NOT DRINK
IN A TYPICAL WEEK, HOW MANY TIMES DO YOU TALK ON THE PHONE WITH FAMILY, FRIENDS, OR NEIGHBORS?: MORE THAN THREE TIMES A WEEK
HOW OFTEN DO YOU GET TOGETHER WITH FRIENDS OR RELATIVES?: MORE THAN THREE TIMES A WEEK
HOW OFTEN DO YOU ATTEND CHURCH OR RELIGIOUS SERVICES?: NEVER
IN A TYPICAL WEEK, HOW MANY TIMES DO YOU TALK ON THE PHONE WITH FAMILY, FRIENDS, OR NEIGHBORS?: MORE THAN THREE TIMES A WEEK
HOW OFTEN DO YOU GET TOGETHER WITH FRIENDS OR RELATIVES?: MORE THAN THREE TIMES A WEEK
HOW OFTEN DO YOU ATTENT MEETINGS OF THE CLUB OR ORGANIZATION YOU BELONG TO?: NEVER
HOW OFTEN DO YOU ATTEND CHURCH OR RELIGIOUS SERVICES?: NEVER
DO YOU BELONG TO ANY CLUBS OR ORGANIZATIONS SUCH AS CHURCH GROUPS UNIONS, FRATERNAL OR ATHLETIC GROUPS, OR SCHOOL GROUPS?: NO
HOW OFTEN DO YOU HAVE A DRINK CONTAINING ALCOHOL: MONTHLY OR LESS
HOW OFTEN DO YOU HAVE SIX OR MORE DRINKS ON ONE OCCASION: NEVER
DO YOU BELONG TO ANY CLUBS OR ORGANIZATIONS SUCH AS CHURCH GROUPS UNIONS, FRATERNAL OR ATHLETIC GROUPS, OR SCHOOL GROUPS?: NO
WITHIN THE PAST 12 MONTHS, YOU WORRIED THAT YOUR FOOD WOULD RUN OUT BEFORE YOU GOT THE MONEY TO BUY MORE: NEVER TRUE
HOW HARD IS IT FOR YOU TO PAY FOR THE VERY BASICS LIKE FOOD, HOUSING, MEDICAL CARE, AND HEATING?: NOT HARD AT ALL

## 2024-07-09 ASSESSMENT — LIFESTYLE VARIABLES
SKIP TO QUESTIONS 9-10: 1
AUDIT-C TOTAL SCORE: 1
HOW OFTEN DO YOU HAVE SIX OR MORE DRINKS ON ONE OCCASION: NEVER
HOW OFTEN DO YOU HAVE A DRINK CONTAINING ALCOHOL: MONTHLY OR LESS
HOW MANY STANDARD DRINKS CONTAINING ALCOHOL DO YOU HAVE ON A TYPICAL DAY: PATIENT DOES NOT DRINK

## 2024-07-25 ENCOUNTER — PATIENT MESSAGE (OUTPATIENT)
Dept: MEDICAL GROUP | Age: 74
End: 2024-07-25
Payer: MEDICARE

## 2024-07-30 ENCOUNTER — OFFICE VISIT (OUTPATIENT)
Dept: MEDICAL GROUP | Age: 74
End: 2024-07-30
Payer: MEDICARE

## 2024-07-30 VITALS
WEIGHT: 227 LBS | BODY MASS INDEX: 36.48 KG/M2 | SYSTOLIC BLOOD PRESSURE: 118 MMHG | TEMPERATURE: 97.1 F | HEIGHT: 66 IN | DIASTOLIC BLOOD PRESSURE: 60 MMHG | OXYGEN SATURATION: 97 % | HEART RATE: 84 BPM

## 2024-07-30 DIAGNOSIS — Z85.3 HISTORY OF BREAST CANCER: ICD-10-CM

## 2024-07-30 DIAGNOSIS — N63.23 MASS OF LOWER OUTER QUADRANT OF LEFT BREAST: ICD-10-CM

## 2024-07-30 DIAGNOSIS — Z12.31 ENCOUNTER FOR SCREENING MAMMOGRAM FOR MALIGNANT NEOPLASM OF BREAST: ICD-10-CM

## 2024-07-30 ASSESSMENT — ENCOUNTER SYMPTOMS
BLOOD IN STOOL: 0
HEARTBURN: 0
WEAKNESS: 0
BLURRED VISION: 0
FEVER: 0
PALPITATIONS: 0
SHORTNESS OF BREATH: 0
HEADACHES: 0
BACK PAIN: 0
DEPRESSION: 0
DOUBLE VISION: 0
BRUISES/BLEEDS EASILY: 0
CHILLS: 0
SENSORY CHANGE: 0
ORTHOPNEA: 0
DIZZINESS: 0
COUGH: 0
WHEEZING: 0
NAUSEA: 0

## 2024-08-08 ENCOUNTER — PATIENT MESSAGE (OUTPATIENT)
Dept: MEDICAL GROUP | Age: 74
End: 2024-08-08
Payer: MEDICARE

## 2024-08-08 NOTE — PATIENT COMMUNICATION
Received request via: Patient    Was the patient seen in the last year in this department? Yes    Does the patient have an active prescription (recently filled or refills available) for medication(s) requested?  Yes    Pharmacy Name: [facility] [address]    Does the patient have skilled nursing Plus and need 100-day supply? (This applies to ALL medications) Yes    Patient left FeedBurner message requesting a refill for GABAPENTIN 100MG

## 2024-08-09 RX ORDER — GABAPENTIN 100 MG/1
300 CAPSULE ORAL
Qty: 300 CAPSULE | Refills: 2 | Status: SHIPPED | OUTPATIENT
Start: 2024-08-09

## 2024-08-13 ENCOUNTER — APPOINTMENT (OUTPATIENT)
Dept: RADIOLOGY | Facility: MEDICAL CENTER | Age: 74
End: 2024-08-13
Attending: STUDENT IN AN ORGANIZED HEALTH CARE EDUCATION/TRAINING PROGRAM
Payer: MEDICARE

## 2024-08-13 DIAGNOSIS — Z12.31 ENCOUNTER FOR SCREENING MAMMOGRAM FOR MALIGNANT NEOPLASM OF BREAST: ICD-10-CM

## 2024-08-13 PROCEDURE — 77067 SCR MAMMO BI INCL CAD: CPT

## 2024-09-05 ENCOUNTER — APPOINTMENT (OUTPATIENT)
Dept: PHYSICAL MEDICINE AND REHAB | Facility: MEDICAL CENTER | Age: 74
End: 2024-09-05
Payer: MEDICARE

## 2024-10-02 ENCOUNTER — HOSPITAL ENCOUNTER (OUTPATIENT)
Dept: RADIOLOGY | Facility: MEDICAL CENTER | Age: 74
End: 2024-10-02
Payer: MEDICARE

## 2024-10-28 ENCOUNTER — HOSPITAL ENCOUNTER (OUTPATIENT)
Dept: RADIOLOGY | Facility: MEDICAL CENTER | Age: 74
End: 2024-10-28
Attending: STUDENT IN AN ORGANIZED HEALTH CARE EDUCATION/TRAINING PROGRAM
Payer: MEDICARE

## 2024-10-28 DIAGNOSIS — R92.8 ABNORMAL MAMMOGRAM: ICD-10-CM

## 2024-10-28 PROCEDURE — 76642 ULTRASOUND BREAST LIMITED: CPT | Mod: LT

## 2024-10-28 PROCEDURE — G0279 TOMOSYNTHESIS, MAMMO: HCPCS

## 2024-10-29 ENCOUNTER — HOSPITAL ENCOUNTER (OUTPATIENT)
Dept: RADIOLOGY | Facility: MEDICAL CENTER | Age: 74
End: 2024-10-29
Attending: STUDENT IN AN ORGANIZED HEALTH CARE EDUCATION/TRAINING PROGRAM
Payer: MEDICARE

## 2024-10-29 DIAGNOSIS — R92.8 ABNORMAL FINDING ON BREAST IMAGING: ICD-10-CM

## 2024-10-29 LAB — PATHOLOGY CONSULT NOTE: NORMAL

## 2024-10-29 PROCEDURE — A4648 IMPLANTABLE TISSUE MARKER: HCPCS | Mod: LT

## 2024-10-29 PROCEDURE — 88305 TISSUE EXAM BY PATHOLOGIST: CPT

## 2024-10-30 ENCOUNTER — TELEPHONE (OUTPATIENT)
Dept: RADIOLOGY | Facility: MEDICAL CENTER | Age: 74
End: 2024-10-30
Payer: MEDICARE

## 2024-11-05 ENCOUNTER — OFFICE VISIT (OUTPATIENT)
Dept: MEDICAL GROUP | Age: 74
End: 2024-11-05
Payer: MEDICARE

## 2024-11-05 VITALS
SYSTOLIC BLOOD PRESSURE: 122 MMHG | TEMPERATURE: 97.2 F | HEIGHT: 66 IN | OXYGEN SATURATION: 95 % | DIASTOLIC BLOOD PRESSURE: 68 MMHG | BODY MASS INDEX: 34.55 KG/M2 | WEIGHT: 215 LBS | HEART RATE: 71 BPM

## 2024-11-05 DIAGNOSIS — G35 MULTIPLE SCLEROSIS (HCC): ICD-10-CM

## 2024-11-05 DIAGNOSIS — M54.50 CHRONIC MIDLINE LOW BACK PAIN WITHOUT SCIATICA: ICD-10-CM

## 2024-11-05 DIAGNOSIS — G89.29 CHRONIC RIGHT SHOULDER PAIN: ICD-10-CM

## 2024-11-05 DIAGNOSIS — G89.29 CHRONIC MIDLINE LOW BACK PAIN WITHOUT SCIATICA: ICD-10-CM

## 2024-11-05 DIAGNOSIS — M25.511 CHRONIC RIGHT SHOULDER PAIN: ICD-10-CM

## 2024-11-05 PROCEDURE — 3078F DIAST BP <80 MM HG: CPT | Performed by: STUDENT IN AN ORGANIZED HEALTH CARE EDUCATION/TRAINING PROGRAM

## 2024-11-05 PROCEDURE — 99214 OFFICE O/P EST MOD 30 MIN: CPT | Performed by: STUDENT IN AN ORGANIZED HEALTH CARE EDUCATION/TRAINING PROGRAM

## 2024-11-05 PROCEDURE — 3074F SYST BP LT 130 MM HG: CPT | Performed by: STUDENT IN AN ORGANIZED HEALTH CARE EDUCATION/TRAINING PROGRAM

## 2024-11-05 ASSESSMENT — ENCOUNTER SYMPTOMS
SHORTNESS OF BREATH: 0
CHILLS: 0
BACK PAIN: 1
ABDOMINAL PAIN: 0
HEADACHES: 0
BLURRED VISION: 0
PALPITATIONS: 0
WHEEZING: 0
DIZZINESS: 0
WEAKNESS: 0
DOUBLE VISION: 0
FEVER: 0
COUGH: 0
BLOOD IN STOOL: 0
BRUISES/BLEEDS EASILY: 0
DEPRESSION: 0

## 2024-11-05 NOTE — PROGRESS NOTES
Subjective:     CC: PT referral    HPI:   History of Present Illness  The patient is a 74-year-old female who presents for evaluation of shoulder and back pain.    She reports experiencing significant pain in her shoulder and back, which she attributes to her sciatic nerve. The pain is localized to her lower back and does not radiate to her legs. Over the past 2 to 3 weeks, her pain has intensified. She avoids taking Advil due to its potential side effects on the kidneys and stomach. She has tried Tylenol and Voltaren cream, but these have not provided significant relief.    She also experiences numbness, tingling, and weakness in her legs, which she believes is due to her Multiple Sclerosis (MS).    Her right shoulder is particularly painful, especially when she raises her arm. She uses a cane for mobility, which unfortunately exacerbates her shoulder pain. She has previously undergone imaging of her shoulder and received an injection in her buttock about a year ago. She has also consulted with Dr. Vinson for her sacroiliac joint issues.       ROS:  Review of Systems   Constitutional:  Negative for chills, fever and malaise/fatigue.   HENT:  Negative for nosebleeds and tinnitus.    Eyes:  Negative for blurred vision and double vision.   Respiratory:  Negative for cough, shortness of breath and wheezing.    Cardiovascular:  Negative for chest pain, palpitations and leg swelling.   Gastrointestinal:  Negative for abdominal pain, blood in stool and melena.   Genitourinary:  Negative for dysuria and urgency.   Musculoskeletal:  Positive for back pain and joint pain.   Skin:  Negative for rash.   Neurological:  Negative for dizziness, weakness and headaches.   Endo/Heme/Allergies:  Does not bruise/bleed easily.   Psychiatric/Behavioral:  Negative for depression and suicidal ideas.        Objective:     Exam:  /68 (BP Location: Right arm, Patient Position: Sitting, BP Cuff Size: Adult)   Pulse 71   Temp 36.2 °C  "(97.2 °F) (Temporal)   Ht 1.676 m (5' 6\")   Wt 97.5 kg (215 lb)   SpO2 95%   BMI 34.70 kg/m²  Body mass index is 34.7 kg/m².    Physical Exam  Vitals reviewed.   Constitutional:       General: She is not in acute distress.  HENT:      Head: Normocephalic and atraumatic.      Mouth/Throat:      Mouth: Mucous membranes are moist.   Eyes:      Extraocular Movements: Extraocular movements intact.      Pupils: Pupils are equal, round, and reactive to light.   Cardiovascular:      Rate and Rhythm: Normal rate and regular rhythm.      Pulses: Normal pulses.      Heart sounds: Normal heart sounds. No murmur heard.  Pulmonary:      Effort: No respiratory distress.      Breath sounds: No wheezing.   Abdominal:      General: There is no distension.      Palpations: Abdomen is soft.      Tenderness: There is no abdominal tenderness. There is no guarding or rebound.   Musculoskeletal:      Cervical back: Normal range of motion and neck supple.      Right lower leg: No edema.      Left lower leg: No edema.      Comments: Reduced range of motion of the right shoulder   Skin:     General: Skin is warm.      Capillary Refill: Capillary refill takes less than 2 seconds.      Coloration: Skin is not jaundiced.   Neurological:      General: No focal deficit present.      Mental Status: She is alert.      Cranial Nerves: No cranial nerve deficit.      Sensory: No sensory deficit.   Psychiatric:         Mood and Affect: Mood normal.         Behavior: Behavior normal.       Labs: Reviewed    Assessment & Plan:     74 y.o. female with the following -     1. Chronic right shoulder pain  She reports significant pain in her right shoulder, which worsens with movement. She uses a cane, which aggravates the pain. She was advised to take Tylenol, up to 1 g every 8 hours, and to use anti-inflammatories for short-term relief. She has previously tried Voltaren cream without significant relief. A referral for physical therapy was provided. If " the pain persists despite physical therapy, repeat imaging of the shoulder will be considered.  - Referral to Physical Therapy    2. Chronic midline low back pain without sciatica  She reports lower back pain, which she believes is related to her sciatic nerve. There is no radiation of pain to the legs, numbness, tingling, or weakness beyond what is already experienced due to her multiple sclerosis (MS). She was advised to take Tylenol, up to 1 g every 8 hours, and to use anti-inflammatories for short-term relief. A referral for physical therapy was provided.  - Referral to Physical Therapy    3. Multiple sclerosis (HCC)  -Chronic, stable  -Following with neurologist    Return if symptoms worsen or fail to improve.    Please note that this dictation was created using voice recognition software. I have made every reasonable attempt to correct obvious errors, but I expect that there are errors of grammar and possibly content that I did not discover before finalizing the note.

## 2024-11-06 ENCOUNTER — OFFICE VISIT (OUTPATIENT)
Dept: NEUROLOGY | Facility: MEDICAL CENTER | Age: 74
End: 2024-11-06
Attending: PSYCHIATRY & NEUROLOGY
Payer: MEDICARE

## 2024-11-06 VITALS
BODY MASS INDEX: 34.55 KG/M2 | SYSTOLIC BLOOD PRESSURE: 124 MMHG | TEMPERATURE: 96.6 F | HEART RATE: 68 BPM | WEIGHT: 215 LBS | HEIGHT: 66 IN | DIASTOLIC BLOOD PRESSURE: 68 MMHG | OXYGEN SATURATION: 99 %

## 2024-11-06 DIAGNOSIS — G35 MULTIPLE SCLEROSIS (HCC): Primary | ICD-10-CM

## 2024-11-06 DIAGNOSIS — Z11.59 ENCOUNTER FOR SCREENING FOR OTHER VIRAL DISEASES: ICD-10-CM

## 2024-11-06 PROCEDURE — 3074F SYST BP LT 130 MM HG: CPT | Performed by: PSYCHIATRY & NEUROLOGY

## 2024-11-06 PROCEDURE — 3078F DIAST BP <80 MM HG: CPT | Performed by: PSYCHIATRY & NEUROLOGY

## 2024-11-06 PROCEDURE — 99205 OFFICE O/P NEW HI 60 MIN: CPT | Performed by: PSYCHIATRY & NEUROLOGY

## 2024-11-06 PROCEDURE — 99215 OFFICE O/P EST HI 40 MIN: CPT

## 2024-11-06 ASSESSMENT — PATIENT HEALTH QUESTIONNAIRE - PHQ9: CLINICAL INTERPRETATION OF PHQ2 SCORE: 0

## 2024-11-06 NOTE — PROGRESS NOTES
"[facility]  MULTIPLE SCLEROSIS & NEUROIMMUNOLOGY  NEW PATIENT VISIT    DISEASE SUMMARY:  Principal neurologic diagnosis: MS  Diagnosis of MS: 1990  Disease History:  - 5/1975: delivered son, episode of numbness with sensory level at the waist, also numbness involving upper extremities, resolved over weeks  - 1980: moved to the [place]  - 7/1990: episode of ON (right), diagnosed w/ MS  - several relapses, treated with IVMP  - 1998: started Avonex, experienced some \"minor exacerbations\"  - 10/2013: breast cancer, treated w/ lumpectomy and radiation (no chemotherapy)  - 2014: started Extavia  - 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, hospitalized x17 days,   - 4/2019: Extavia was stopped due to disease inactivity  - 4/16/2021: awoke from sleep with lower extremity (R>L) weakness, required a cane, 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, treated with IVMP, MRI showed no change  - 6/2021: fever with reemergence of symptoms  - 6/2/2021, 12/16/2021, 7/1/2022..., 10/2023: treated w/ rituximab  - 5/2023: uterine cancer, treated w/ hysterectomy  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 most of these foci are stable from prior study, however one small focus in the paramedian right  frontal lobe is equivocally more prominent and there may be a new focus along the posterior aspect of the left corona radiata... no abnormal enhancement\"  - 12/28/2014: \"interval resolution of the T2 hyper intensities in bilateral " "occipitoparietal region...\"  - 9/30/2014:   - 9/26/2014: \"most suggestive of PRES\"  MRI cervical spine:  - 12/3/2020: report unavailable  MRI thoracic spine:  - 11/11/2018: report unavailable  Immunizations:  - influenza?:   - Pneumonia?:  - SARS-CoV-2?:   Cancer Screens:  - mammogram:   - PAP?:   - skin check:     CC: MS    HISTORY OF ILLNESS:  Lisa Chand is a 74 y.o. woman with a history most notable for MS, hypogammaglobulinemia, HTN, HLD, pre-diabetes, atherosclerosis (aorta), CKD (stage 3), breast cancer, uterine cancer, and RLS.  Today, she was unaccompanied, and she provided the following history:    A review of MS-related symptoms was notable for the following:    Fatigue: yes;  just diagnosed w/ inoperable liver cancer,doesn't sleep well, doesn't sleep in same bed as  (he snores and gets up multiple times/night)  Weakness: yes; lower extremities are somewhat weak  Numbness: yes; mild in the feet  Incoordination:   Spasms/Spasticity: no  Vision Impairment: yes; vision is worse in right eye  Walking/Balance Problems: yes; uses a cane, last fell in 5/2024 (dog wrapped leash around her legs), she has to use a cart at the supermarket  Neuralgia: yes; involving the legs (distal to knees) and feet  Bowel Symptoms: yes; incontinence, takes imodium PRN  Bladder Symptoms: yes; frequency: yes, urgency: sometimes, hesitancy: no, incomplete emptying: no, incontinence: no (takes imodium)  Heat Sensitivity: no  Depression: no  Cognitive/Memory Problems: yes; not as bad lately  Sexual Dysfunction: not assessed  Anxiety: yes; related to 's health    Social:  Her  has liver cancer.  They liver with her daughter and son-in-law.    MEDICAL AND SURGICAL HISTORY:  Past Medical History:   Diagnosis Date    BMI 40.0-44.9, adult (HCC) 12/19/2023    Morbid obesity with BMI of 40.0-44.9, adult (HCC) 12/12/2023    Right trigeminal neuralgia 07/30/2013     Past Surgical History:   Procedure " Laterality Date    MN INJECTION,SACROILIAC JOINT Right 1/24/2024    Procedure: RIGHT sacroiliac joint injection with fluoroscopic guidance;  Surgeon: Lamont Vinson M.D.;  [facility];  Service: Pain Management     MEDICATIONS:  Current Outpatient Medications   Medication Sig    gabapentin (NEURONTIN) 100 MG Cap Take 3 Capsules by mouth at bedtime.    FLUoxetine (PROZAC) 10 MG Cap Take 1 Capsule by mouth every day.    Semaglutide 7 MG Tab Take 1 Tablet by mouth every day.    ROPINIRole (REQUIP) 1 MG Tab Take 1 Tablet by mouth 4 times a day.    omeprazole (PRILOSEC) 20 MG delayed-release capsule Take 1 Capsule by mouth every day.    rosuvastatin (CRESTOR) 10 MG Tab Take 1 Tablet by mouth every evening.    levothyroxine (SYNTHROID) 88 MCG Tab Take 1 Tablet by mouth every morning on an empty stomach.    losartan-hydrochlorothiazide (HYZAAR) 50-12.5 MG per tablet Take 2 Tablets by mouth every day.     SOCIAL HISTORY:  Social History     Tobacco Use    Smoking status: Never    Smokeless tobacco: Never   Substance Use Topics    Alcohol use: Not Currently     Comment: occ     Social History     Social History Narrative    Not on file     FAMILY HISTORY:  No family history on file.  REVIEW OF SYSTEMS:  A ROS was completed.  Pertinent positives and negatives were included in the HPI, above.  All other systems were reviewed and are negative.    PHYSICAL EXAM:  General/Medical:  - NAD  - hair, skin, nails, and joints were normal    Neuro:  MENTAL STATUS: awake and alert; no deficits of speech or language; oriented to conversation; affect was appropriate to situation; pleasant, cooperative    CRANIAL NERVES:    II: acuity: J5/J1+ with progressive lenses, fields: intact to confrontation, pupils: 3.5/3 to 2.5/2 without a relative afferent pupillary defect, discs: sharp, pallor on the right    III/IV/VI: versions: intact without nystagmus    V: facial sensation: symmetric to light touch    VII: facial  "expression: symmetric    VIII: hearing: intact to finger rub    IX/X: palate: elevates symmetrically    XI: shoulder shrug: symmetric    XII: tongue: midline    MOTOR:  - bulk: normal throughout  - tone: normal in the upper extremities  Upper Extremity Strength  (R/L)    5/5   Elbow flexion 5/5   Elbow extension 5/5   Shoulder abduction 5/5     Lower Extremity Strength  (R/L)   Hip flexion 4/4   Knee extension 5/5   Knee flexion NT   Ankle dorsiflexion NT   Ankle plantarflexion NT     - heel-walk: NT  - toe-walk: NT  - pronator drift: absent  - abnormal movements: none    SENSATION:  - light touch: grossly intact over the upper- and lower extremities  - vibration (R/L, seconds): NT/NT at the great toes  - pinprick: NT  - proprioception: NT  - Romberg: present    COORDINATION:  - finger to nose: NT  - finger tapping: NT    REFLEXES:  Reflex Right Left   BR 2+ 2+   Biceps 2+ 2+   Triceps 1+ 1+   Patellae 2+ 2+   Achilles 1+ 1+   Toes NT NT     GAIT:  - wide based  - tandem gait: NT    QUANTITATIVE SCORES:  Timed 25-foot walk (sec): NT today.  Assistive device: none    REVIEW OF IMAGING STUDIES:  I have summarized all available data above.    REVIEW OF LABORATORY STUDIES:  I have summarized available data above.    ASSESSMENT:  Lisa Chand is a 74 y.o. woman with MS and a history otherwise notable for hypogammaglobulinemia, HTN, HLD, pre-diabetes, atherosclerosis (aorta), CKD (stage 3), breast cancer, uterine cancer, and RLS.  Mrs. Chand was very much in favor of continuing immunotherapy given the cord relapse which occurred on 4/16/2021.  She assures me that imaging at the time showed \"enhancement\" in the thoracic cord (outside notes are ambiguous as to whether or not there was enhancement).  She seemed to tolerate rituximab well.  We discussed various options, as listed below.  I have ordered a standard set of pre-treatment labs.  Mrs. Chand has agreed to request old MRIs and provide these to " "the clinic.  Once images are uploaded into PACS plan for repeat MRI brain and cervical spine.    PLAN:  MS:  - could consider:   - rituximab (tolerated in the past, increases risk of infection but vaccinated)   - Kesimpta (just like rituximab but administered via auto-injector once monthly   - Zeposia (oral medication dosed once daily, good data showing safety in older persons)   - Mavenclad (induces a profound but brief immunosuppression, could be used as an \"exit strategy\")   - interferons (injections, can be dosed as infrequently as once every 2 weeks, associated with flu-like symptoms, considered immunomodulatory rather than immunosuppressive)    - obtain outside images  Orders Placed This Encounter    MR-BRAIN-WITH & W/O    MR-CERVICAL SPINE-WITH & W/O    Comp Metabolic Panel    hepatitis B surface Ab    hepatitis b surface antigen    hepatitis C antibody    IgA    IgG    IgM    lymphocyte subsets (B & T cells)    quantiferon gold plus TB (4 tube)    varicella zoster IgG Ab    CBC with differential     Follow-Up:  - Return in about 2 months (around 1/6/2025).    Signed: Ramsey Salazar M.D.    BILLING DOCUMENTATION:   I spent 74 minutes reviewing the medical record, interviewing and examining the patient, discussing my impression (see \"assessment\" above), and coordinating care.  "

## 2024-11-11 ENCOUNTER — HOSPITAL ENCOUNTER (OUTPATIENT)
Dept: LAB | Facility: MEDICAL CENTER | Age: 74
End: 2024-11-11
Attending: STUDENT IN AN ORGANIZED HEALTH CARE EDUCATION/TRAINING PROGRAM
Payer: MEDICARE

## 2024-11-11 ENCOUNTER — HOSPITAL ENCOUNTER (OUTPATIENT)
Dept: LAB | Facility: MEDICAL CENTER | Age: 74
End: 2024-11-11
Attending: PSYCHIATRY & NEUROLOGY
Payer: MEDICARE

## 2024-11-11 DIAGNOSIS — R73.03 PREDIABETES: ICD-10-CM

## 2024-11-11 DIAGNOSIS — I10 PRIMARY HYPERTENSION: Chronic | ICD-10-CM

## 2024-11-11 DIAGNOSIS — Z11.59 ENCOUNTER FOR SCREENING FOR OTHER VIRAL DISEASES: ICD-10-CM

## 2024-11-11 DIAGNOSIS — E78.5 DYSLIPIDEMIA: Chronic | ICD-10-CM

## 2024-11-11 DIAGNOSIS — E55.9 VITAMIN D INSUFFICIENCY: ICD-10-CM

## 2024-11-11 DIAGNOSIS — G35 MULTIPLE SCLEROSIS (HCC): ICD-10-CM

## 2024-11-11 LAB
BASOPHILS # BLD AUTO: 1.1 % (ref 0–1.8)
BASOPHILS # BLD: 0.06 K/UL (ref 0–0.12)
EOSINOPHIL # BLD AUTO: 0.05 K/UL (ref 0–0.51)
EOSINOPHIL NFR BLD: 0.9 % (ref 0–6.9)
ERYTHROCYTE [DISTWIDTH] IN BLOOD BY AUTOMATED COUNT: 46.4 FL (ref 35.9–50)
EST. AVERAGE GLUCOSE BLD GHB EST-MCNC: 123 MG/DL
HBA1C MFR BLD: 5.9 % (ref 4–5.6)
HCT VFR BLD AUTO: 42.8 % (ref 37–47)
HGB BLD-MCNC: 14.2 G/DL (ref 12–16)
IMM GRANULOCYTES # BLD AUTO: 0.02 K/UL (ref 0–0.11)
IMM GRANULOCYTES NFR BLD AUTO: 0.4 % (ref 0–0.9)
LYMPHOCYTES # BLD AUTO: 1.44 K/UL (ref 1–4.8)
LYMPHOCYTES NFR BLD: 25.2 % (ref 22–41)
MCH RBC QN AUTO: 29 PG (ref 27–33)
MCHC RBC AUTO-ENTMCNC: 33.2 G/DL (ref 32.2–35.5)
MCV RBC AUTO: 87.3 FL (ref 81.4–97.8)
MONOCYTES # BLD AUTO: 0.43 K/UL (ref 0–0.85)
MONOCYTES NFR BLD AUTO: 7.5 % (ref 0–13.4)
NEUTROPHILS # BLD AUTO: 3.71 K/UL (ref 1.82–7.42)
NEUTROPHILS NFR BLD: 64.9 % (ref 44–72)
NRBC # BLD AUTO: 0 K/UL
NRBC BLD-RTO: 0 /100 WBC (ref 0–0.2)
PLATELET # BLD AUTO: 278 K/UL (ref 164–446)
PMV BLD AUTO: 11.6 FL (ref 9–12.9)
RBC # BLD AUTO: 4.9 M/UL (ref 4.2–5.4)
WBC # BLD AUTO: 5.7 K/UL (ref 4.8–10.8)

## 2024-11-11 PROCEDURE — 86787 VARICELLA-ZOSTER ANTIBODY: CPT

## 2024-11-11 PROCEDURE — 83036 HEMOGLOBIN GLYCOSYLATED A1C: CPT

## 2024-11-11 PROCEDURE — 86357 NK CELLS TOTAL COUNT: CPT

## 2024-11-11 PROCEDURE — 80053 COMPREHEN METABOLIC PANEL: CPT | Mod: 91

## 2024-11-11 PROCEDURE — 82306 VITAMIN D 25 HYDROXY: CPT

## 2024-11-11 PROCEDURE — 85025 COMPLETE CBC W/AUTO DIFF WBC: CPT

## 2024-11-11 PROCEDURE — 82784 ASSAY IGA/IGD/IGG/IGM EACH: CPT

## 2024-11-11 PROCEDURE — 86355 B CELLS TOTAL COUNT: CPT

## 2024-11-11 PROCEDURE — 80061 LIPID PANEL: CPT

## 2024-11-11 PROCEDURE — 36415 COLL VENOUS BLD VENIPUNCTURE: CPT

## 2024-11-11 PROCEDURE — 80053 COMPREHEN METABOLIC PANEL: CPT

## 2024-11-11 PROCEDURE — 86359 T CELLS TOTAL COUNT: CPT

## 2024-11-11 PROCEDURE — 87340 HEPATITIS B SURFACE AG IA: CPT

## 2024-11-11 PROCEDURE — 86706 HEP B SURFACE ANTIBODY: CPT

## 2024-11-11 PROCEDURE — 86803 HEPATITIS C AB TEST: CPT

## 2024-11-11 PROCEDURE — 86360 T CELL ABSOLUTE COUNT/RATIO: CPT

## 2024-11-12 LAB
25(OH)D3 SERPL-MCNC: 28 NG/ML (ref 30–100)
ALBUMIN SERPL BCP-MCNC: 4.3 G/DL (ref 3.2–4.9)
ALBUMIN SERPL BCP-MCNC: 4.3 G/DL (ref 3.2–4.9)
ALBUMIN/GLOB SERPL: 1.6 G/DL
ALBUMIN/GLOB SERPL: 1.6 G/DL
ALP SERPL-CCNC: 113 U/L (ref 30–99)
ALP SERPL-CCNC: 114 U/L (ref 30–99)
ALT SERPL-CCNC: 27 U/L (ref 2–50)
ALT SERPL-CCNC: 28 U/L (ref 2–50)
ANION GAP SERPL CALC-SCNC: 11 MMOL/L (ref 7–16)
ANION GAP SERPL CALC-SCNC: 12 MMOL/L (ref 7–16)
AST SERPL-CCNC: 33 U/L (ref 12–45)
AST SERPL-CCNC: 34 U/L (ref 12–45)
BILIRUB SERPL-MCNC: 0.2 MG/DL (ref 0.1–1.5)
BILIRUB SERPL-MCNC: 0.3 MG/DL (ref 0.1–1.5)
BUN SERPL-MCNC: 14 MG/DL (ref 8–22)
BUN SERPL-MCNC: 14 MG/DL (ref 8–22)
CALCIUM ALBUM COR SERPL-MCNC: 9.3 MG/DL (ref 8.5–10.5)
CALCIUM ALBUM COR SERPL-MCNC: 9.4 MG/DL (ref 8.5–10.5)
CALCIUM SERPL-MCNC: 9.5 MG/DL (ref 8.5–10.5)
CALCIUM SERPL-MCNC: 9.6 MG/DL (ref 8.5–10.5)
CHLORIDE SERPL-SCNC: 102 MMOL/L (ref 96–112)
CHLORIDE SERPL-SCNC: 102 MMOL/L (ref 96–112)
CHOLEST SERPL-MCNC: 126 MG/DL (ref 100–199)
CO2 SERPL-SCNC: 26 MMOL/L (ref 20–33)
CO2 SERPL-SCNC: 26 MMOL/L (ref 20–33)
CREAT SERPL-MCNC: 0.93 MG/DL (ref 0.5–1.4)
CREAT SERPL-MCNC: 0.93 MG/DL (ref 0.5–1.4)
FASTING STATUS PATIENT QL REPORTED: NORMAL
GFR SERPLBLD CREATININE-BSD FMLA CKD-EPI: 64 ML/MIN/1.73 M 2
GFR SERPLBLD CREATININE-BSD FMLA CKD-EPI: 64 ML/MIN/1.73 M 2
GLOBULIN SER CALC-MCNC: 2.7 G/DL (ref 1.9–3.5)
GLOBULIN SER CALC-MCNC: 2.7 G/DL (ref 1.9–3.5)
GLUCOSE SERPL-MCNC: 92 MG/DL (ref 65–99)
GLUCOSE SERPL-MCNC: 93 MG/DL (ref 65–99)
HBV SURFACE AB SERPL IA-ACNC: <3.5 MIU/ML (ref 0–10)
HBV SURFACE AG SER QL: NONREACTIVE
HCV AB SER QL: NONREACTIVE
HDLC SERPL-MCNC: 54 MG/DL
LDLC SERPL CALC-MCNC: 44 MG/DL
POTASSIUM SERPL-SCNC: 3.7 MMOL/L (ref 3.6–5.5)
POTASSIUM SERPL-SCNC: 3.7 MMOL/L (ref 3.6–5.5)
PROT SERPL-MCNC: 7 G/DL (ref 6–8.2)
PROT SERPL-MCNC: 7 G/DL (ref 6–8.2)
SODIUM SERPL-SCNC: 139 MMOL/L (ref 135–145)
SODIUM SERPL-SCNC: 140 MMOL/L (ref 135–145)
TRIGL SERPL-MCNC: 138 MG/DL (ref 0–149)

## 2024-11-13 ENCOUNTER — HOSPITAL ENCOUNTER (OUTPATIENT)
Dept: LAB | Facility: MEDICAL CENTER | Age: 74
End: 2024-11-13
Attending: PSYCHIATRY & NEUROLOGY
Payer: MEDICARE

## 2024-11-13 PROCEDURE — 86480 TB TEST CELL IMMUN MEASURE: CPT

## 2024-11-14 LAB
ANNOTATION COMMENT IMP: ABNORMAL
CD19 CELLS NFR SPEC: 4 % (ref 5–21)
CD3 CELLS # BLD: 1108 CELLS/UL (ref 660–2200)
CD3 CELLS NFR SPEC: 75 % (ref 62–89)
CD3+CD4+ CELLS # BLD: 970 CELLS/UL (ref 490–1600)
CD3+CD4+ CELLS NFR BLD: 66 % (ref 35–68)
CD3+CD4+ CELLS/CD3+CD8+ CLL BLD: 6.6 RATIO (ref 0.8–6.17)
CD3+CD8+ CELLS # BLD: 141 CELLS/UL (ref 150–1050)
CD3+CD8+ CELLS NFR SPEC: 10 % (ref 10–46)
CD3-CD16+CD56+ CELLS # SPEC: 296 CELLS/UL (ref 74–620)
CD3-CD16+CD56+ CELLS NFR SPEC: 20 % (ref 5–28)
CELLS.CD3-CD19+ [#/VOLUME] IN BLOOD: 61 CELLS/UL (ref 74–510)
IGA SERPL-MCNC: 224 MG/DL (ref 68–408)
IGG SERPL-MCNC: 761 MG/DL (ref 768–1632)
IGM SERPL-MCNC: 63 MG/DL (ref 35–263)
VZV IGG SER IA-ACNC: 14.3 S/CO

## 2024-11-15 LAB
GAMMA INTERFERON BACKGROUND BLD IA-ACNC: 0.02 IU/ML
M TB IFN-G BLD-IMP: NEGATIVE
M TB IFN-G CD4+ BCKGRND COR BLD-ACNC: 0.01 IU/ML
MITOGEN IGNF BCKGRD COR BLD-ACNC: >10 IU/ML
QFT TB2 - NIL TBQ2: 0.01 IU/ML

## 2024-12-11 RX ORDER — GABAPENTIN 100 MG/1
300 CAPSULE ORAL
Qty: 300 CAPSULE | Refills: 2 | Status: SHIPPED | OUTPATIENT
Start: 2024-12-11

## 2024-12-11 NOTE — TELEPHONE ENCOUNTER
Received request via: Patient    Was the patient seen in the last year in this department? Yes    Does the patient have an active prescription (recently filled or refills available) for medication(s) requested? No    Pharmacy Name: [facility]/PHARMACY #[id number] [place], [place] - [address]    Does the patient have correction Plus and need 100-day supply? (This applies to ALL medications) Yes, quantity updated to 100 days     Requested Prescriptions     Pending Prescriptions Disp Refills    gabapentin (NEURONTIN) 100 MG Cap 300 Capsule 2     Sig: Take 3 Capsules by mouth at bedtime.

## 2024-12-12 ENCOUNTER — PATIENT MESSAGE (OUTPATIENT)
Dept: MEDICAL GROUP | Age: 74
End: 2024-12-12
Payer: MEDICARE

## 2024-12-12 RX ORDER — LOSARTAN POTASSIUM AND HYDROCHLOROTHIAZIDE 12.5; 5 MG/1; MG/1
2 TABLET ORAL DAILY
Qty: 200 TABLET | Refills: 2 | Status: SHIPPED | OUTPATIENT
Start: 2024-12-12

## 2024-12-12 RX ORDER — FLUOXETINE 10 MG/1
10 CAPSULE ORAL DAILY
Qty: 100 CAPSULE | Refills: 3 | Status: SHIPPED | OUTPATIENT
Start: 2024-12-12 | End: 2024-12-16 | Stop reason: SDUPTHER

## 2024-12-16 DIAGNOSIS — F41.9 ANXIETY AND DEPRESSION: ICD-10-CM

## 2024-12-16 DIAGNOSIS — F32.A ANXIETY AND DEPRESSION: ICD-10-CM

## 2024-12-16 RX ORDER — FLUOXETINE 10 MG/1
20 CAPSULE ORAL DAILY
Qty: 100 CAPSULE | Refills: 3 | Status: SHIPPED | OUTPATIENT
Start: 2024-12-16

## 2025-01-05 ENCOUNTER — HOSPITAL ENCOUNTER (OUTPATIENT)
Dept: RADIOLOGY | Facility: MEDICAL CENTER | Age: 75
End: 2025-01-05
Attending: PSYCHIATRY & NEUROLOGY
Payer: MEDICARE

## 2025-01-05 DIAGNOSIS — G35 MULTIPLE SCLEROSIS (HCC): ICD-10-CM

## 2025-01-05 PROCEDURE — 70553 MRI BRAIN STEM W/O & W/DYE: CPT

## 2025-01-05 PROCEDURE — 72156 MRI NECK SPINE W/O & W/DYE: CPT

## 2025-01-05 PROCEDURE — A9579 GAD-BASE MR CONTRAST NOS,1ML: HCPCS | Mod: JZ

## 2025-01-05 PROCEDURE — 700117 HCHG RX CONTRAST REV CODE 255: Mod: JZ

## 2025-01-05 RX ADMIN — GADOTERIDOL 20 ML: 279.3 INJECTION, SOLUTION INTRAVENOUS at 17:21

## 2025-01-22 DIAGNOSIS — F32.A ANXIETY AND DEPRESSION: ICD-10-CM

## 2025-01-22 DIAGNOSIS — F41.9 ANXIETY AND DEPRESSION: ICD-10-CM

## 2025-01-23 ENCOUNTER — OFFICE VISIT (OUTPATIENT)
Dept: MEDICAL GROUP | Age: 75
End: 2025-01-23
Payer: MEDICARE

## 2025-01-23 VITALS
HEIGHT: 66 IN | TEMPERATURE: 97.2 F | HEART RATE: 67 BPM | DIASTOLIC BLOOD PRESSURE: 62 MMHG | BODY MASS INDEX: 33.43 KG/M2 | OXYGEN SATURATION: 97 % | WEIGHT: 208 LBS | SYSTOLIC BLOOD PRESSURE: 110 MMHG

## 2025-01-23 DIAGNOSIS — G35 MULTIPLE SCLEROSIS (HCC): ICD-10-CM

## 2025-01-23 DIAGNOSIS — Z02.89 ENCOUNTER FOR COMPLETION OF FORM WITH PATIENT: ICD-10-CM

## 2025-01-23 DIAGNOSIS — F13.20 SEDATIVE, HYPNOTIC OR ANXIOLYTIC DEPENDENCE, UNCOMPLICATED (HCC): ICD-10-CM

## 2025-01-23 DIAGNOSIS — M48.02 NEUROFORAMINAL STENOSIS OF CERVICAL SPINE: ICD-10-CM

## 2025-01-23 DIAGNOSIS — N18.31 STAGE 3A CHRONIC KIDNEY DISEASE (CKD): ICD-10-CM

## 2025-01-23 DIAGNOSIS — E66.9 OBESITY (BMI 30-39.9): ICD-10-CM

## 2025-01-23 PROCEDURE — 99214 OFFICE O/P EST MOD 30 MIN: CPT | Performed by: STUDENT IN AN ORGANIZED HEALTH CARE EDUCATION/TRAINING PROGRAM

## 2025-01-23 PROCEDURE — 3078F DIAST BP <80 MM HG: CPT | Performed by: STUDENT IN AN ORGANIZED HEALTH CARE EDUCATION/TRAINING PROGRAM

## 2025-01-23 PROCEDURE — 3074F SYST BP LT 130 MM HG: CPT | Performed by: STUDENT IN AN ORGANIZED HEALTH CARE EDUCATION/TRAINING PROGRAM

## 2025-01-23 RX ORDER — FLUOXETINE 10 MG/1
20 CAPSULE ORAL DAILY
Qty: 100 CAPSULE | Refills: 3 | Status: SHIPPED | OUTPATIENT
Start: 2025-01-23

## 2025-01-23 ASSESSMENT — ENCOUNTER SYMPTOMS
DOUBLE VISION: 0
BACK PAIN: 1
SHORTNESS OF BREATH: 0
DEPRESSION: 0
DIZZINESS: 0
CHILLS: 0
WEAKNESS: 0
FEVER: 0
BLOOD IN STOOL: 0
PALPITATIONS: 0
NECK PAIN: 1
BLURRED VISION: 0
ABDOMINAL PAIN: 0
HEADACHES: 0

## 2025-01-23 ASSESSMENT — FIBROSIS 4 INDEX: FIB4 SCORE: 1.74

## 2025-01-23 ASSESSMENT — PATIENT HEALTH QUESTIONNAIRE - PHQ9: CLINICAL INTERPRETATION OF PHQ2 SCORE: 0

## 2025-01-23 NOTE — PROGRESS NOTES
Subjective:     CC: DMV paperwork    HPI:   History of Present Illness  The patient is a 74-year-old female presenting for DMV paperwork.    She reports that her most recent MRI scan revealed severe spinal constriction, a condition she was initially informed about 20 years ago. At that time, her neurologist cautioned her about the potential risk of waking up paralyzed one day. She has undergone 2 MRIs, one of which indicated severe constriction. She is uncertain whether the severity was noted in the brain or cervical spine MRI. She experiences discomfort when lifting objects and has noticed an increase in the severity of this symptom. Prolonged standing results in pain in the back of her neck and lower spine. She has been experiencing numbness in her feet for several years, which she attributes to her MS. Additionally, she reports frequent episodes of food getting lodged in her esophagus, sometimes to a severe degree, requiring significant effort to clear it. She speculates that this could be related to her spinal cord condition. She has a scheduled physical therapy session in March.       Problem   Obesity (Bmi 30-39.9)       ROS:  Review of Systems   Constitutional:  Negative for chills, fever and malaise/fatigue.   HENT:  Negative for congestion and tinnitus.    Eyes:  Negative for blurred vision and double vision.   Respiratory:  Negative for shortness of breath.    Cardiovascular:  Negative for chest pain and palpitations.   Gastrointestinal:  Negative for abdominal pain, blood in stool and melena.   Genitourinary:  Negative for dysuria and urgency.   Musculoskeletal:  Positive for back pain and neck pain. Negative for joint pain.   Skin:  Negative for rash.   Neurological:  Negative for dizziness, weakness and headaches.   Psychiatric/Behavioral:  Negative for depression and suicidal ideas.        Objective:     Exam:  /62 (BP Location: Right arm, Patient Position: Sitting, BP Cuff Size: Large adult)    "Pulse 67   Temp 36.2 °C (97.2 °F) (Temporal)   Ht 1.676 m (5' 6\")   Wt 94.3 kg (208 lb)   SpO2 97%   BMI 33.57 kg/m²  Body mass index is 33.57 kg/m².    Physical Exam  Vitals reviewed.   Constitutional:       General: She is not in acute distress.  HENT:      Head: Normocephalic and atraumatic.      Mouth/Throat:      Mouth: Mucous membranes are moist.   Eyes:      General: No scleral icterus.     Extraocular Movements: Extraocular movements intact.      Pupils: Pupils are equal, round, and reactive to light.   Cardiovascular:      Rate and Rhythm: Normal rate and regular rhythm.      Pulses: Normal pulses.      Heart sounds: Normal heart sounds. No murmur heard.  Pulmonary:      Effort: Pulmonary effort is normal. No respiratory distress.      Breath sounds: Normal breath sounds. No wheezing.   Abdominal:      General: There is no distension.      Palpations: Abdomen is soft.      Tenderness: There is no abdominal tenderness. There is no guarding or rebound.   Musculoskeletal:      Cervical back: Normal range of motion and neck supple.      Right lower leg: No edema.      Left lower leg: No edema.   Skin:     General: Skin is warm.      Capillary Refill: Capillary refill takes less than 2 seconds.      Coloration: Skin is not jaundiced.   Neurological:      General: No focal deficit present.      Mental Status: She is alert.   Psychiatric:         Mood and Affect: Mood normal.         Behavior: Behavior normal.       Labs: Reviewed    Assessment & Plan:     74 y.o. female with the following -     1. Encounter for completion of form with patient  -DMV paperwork signed today  -Patient in need of new disabled plaque    2. Neuroforaminal stenosis of cervical spine  The MRI results indicate mild central canal stenosis at C5-C6 and moderate-to-severe multilevel neuroforaminal stenosis. Symptoms include discomfort when lifting, pain in the back of the neck and lower spine when standing for extended periods, and " numbness in the feet. These symptoms need to be correlated with the MRI findings.   Patient is not exhibiting any red flag symptoms such as weakness or persistent numbness of upper extremities.  Physical therapy is recommended to manage these symptoms. If symptoms worsen, a referral to a specialist may be considered.    3. Sedative, hypnotic or anxiolytic dependence, uncomplicated (HCC)  -Chronic, improving  -Patient currently on gabapentin and Requip    4. Multiple sclerosis (HCC)  -Chronic, stable  -Follow-up with neurologist    5. Stage 3a chronic kidney disease (CKD)  -Chronic, stable  -Most recent metabolic panel showed normal creatinine and GFR    6. Obesity (BMI 30-39.9)  -Chronic, stable  -No significant weight changes over the past 6 months  -Encouraged to continue excising regularly, DASH diet or plant-based diet  - Patient identified as having weight management issue.  Appropriate orders and counseling given.     HCC Gap Form    Diagnosis to address: F13.20 - Sedative, hypnotic or anxiolytic dependence, uncomplicated (HCC)  Assessment and plan: Chronic, improving. Continue with current defined treatment plan: Continue same therapy.  Follow-up at least annually.  Diagnosis: G35 - Multiple sclerosis (HCC)  Assessment and plan: Chronic, stable, as based on today's assessment and impact on other conditions evaluated today. Continue with current treatment plan: Continue to follow with neurologist.  Follow-up with specialist as directed, but at least annually.  Diagnosis: N18.31 - Stage 3a chronic kidney disease (CKD)  Assessment and plan: Chronic, stable. Continue with current defined treatment plan: Continue medical therapy. Follow-up at least annually.  Last edited 01/23/25 07:45 PST by Alfonso Ponce M.D.           Return if symptoms worsen or fail to improve.    Please note that this dictation was created using voice recognition software. I have made every reasonable attempt to correct obvious errors,  but I expect that there are errors of grammar and possibly content that I did not discover before finalizing the note.

## 2025-02-18 DIAGNOSIS — M54.2 CERVICALGIA: Primary | ICD-10-CM

## 2025-02-24 DIAGNOSIS — F41.9 ANXIETY AND DEPRESSION: ICD-10-CM

## 2025-02-24 DIAGNOSIS — F32.A ANXIETY AND DEPRESSION: ICD-10-CM

## 2025-02-25 RX ORDER — OMEPRAZOLE 20 MG/1
20 CAPSULE, DELAYED RELEASE ORAL DAILY
Qty: 100 CAPSULE | Refills: 2 | Status: SHIPPED | OUTPATIENT
Start: 2025-02-25

## 2025-02-25 RX ORDER — FLUOXETINE 10 MG/1
20 CAPSULE ORAL DAILY
Qty: 100 CAPSULE | Refills: 3 | Status: SHIPPED | OUTPATIENT
Start: 2025-02-25

## 2025-02-25 RX ORDER — LOSARTAN POTASSIUM AND HYDROCHLOROTHIAZIDE 12.5; 5 MG/1; MG/1
2 TABLET ORAL DAILY
Qty: 200 TABLET | Refills: 2 | Status: SHIPPED | OUTPATIENT
Start: 2025-02-25

## 2025-02-25 RX ORDER — LEVOTHYROXINE SODIUM 88 UG/1
88 TABLET ORAL
Qty: 100 TABLET | Refills: 2 | Status: SHIPPED | OUTPATIENT
Start: 2025-02-25

## 2025-02-25 RX ORDER — ROPINIROLE 1 MG/1
1 TABLET, FILM COATED ORAL 4 TIMES DAILY
Qty: 400 TABLET | Refills: 2 | Status: SHIPPED | OUTPATIENT
Start: 2025-02-25

## 2025-02-25 RX ORDER — GABAPENTIN 100 MG/1
300 CAPSULE ORAL
Qty: 300 CAPSULE | Refills: 2 | Status: SHIPPED | OUTPATIENT
Start: 2025-02-25

## 2025-02-25 RX ORDER — ROSUVASTATIN CALCIUM 10 MG/1
10 TABLET, COATED ORAL EVERY EVENING
Qty: 100 TABLET | Refills: 2 | Status: SHIPPED | OUTPATIENT
Start: 2025-02-25

## 2025-02-25 NOTE — TELEPHONE ENCOUNTER
Received request via: Patient    Was the patient seen in the last year in this department? Yes    Does the patient have an active prescription (recently filled or refills available) for medication(s) requested? No    Pharmacy Name: [facility]/pharmacy #[id number] - [place], [place] - [address]     Does the patient have [employer] Plus and need 100-day supply? (This applies to ALL medications) Yes, quantity updated to 100 days

## 2025-02-25 NOTE — TELEPHONE ENCOUNTER
Received request via: Patient    Was the patient seen in the last year in this department? Yes    Does the patient have an active prescription (recently filled or refills available) for medication(s) requested? No    Pharmacy Name: [facility]    Does the patient have [facility] Plus and need 100-day supply? (This applies to ALL medications) Yes, quantity updated to 100 days

## 2025-03-06 ENCOUNTER — APPOINTMENT (OUTPATIENT)
Dept: PHYSICAL THERAPY | Facility: MEDICAL CENTER | Age: 75
End: 2025-03-06
Attending: STUDENT IN AN ORGANIZED HEALTH CARE EDUCATION/TRAINING PROGRAM
Payer: MEDICARE

## 2025-03-11 ENCOUNTER — APPOINTMENT (OUTPATIENT)
Dept: PHYSICAL THERAPY | Facility: MEDICAL CENTER | Age: 75
End: 2025-03-11
Attending: STUDENT IN AN ORGANIZED HEALTH CARE EDUCATION/TRAINING PROGRAM
Payer: MEDICARE

## 2025-03-13 ENCOUNTER — APPOINTMENT (OUTPATIENT)
Dept: PHYSICAL THERAPY | Facility: MEDICAL CENTER | Age: 75
End: 2025-03-13
Attending: STUDENT IN AN ORGANIZED HEALTH CARE EDUCATION/TRAINING PROGRAM
Payer: MEDICARE

## 2025-03-18 ENCOUNTER — APPOINTMENT (OUTPATIENT)
Dept: PHYSICAL THERAPY | Facility: MEDICAL CENTER | Age: 75
End: 2025-03-18
Attending: STUDENT IN AN ORGANIZED HEALTH CARE EDUCATION/TRAINING PROGRAM
Payer: MEDICARE

## 2025-03-25 ENCOUNTER — OFFICE VISIT (OUTPATIENT)
Dept: MEDICAL GROUP | Facility: LAB | Age: 75
End: 2025-03-25
Payer: MEDICARE

## 2025-03-25 ENCOUNTER — APPOINTMENT (OUTPATIENT)
Dept: PHYSICAL THERAPY | Facility: MEDICAL CENTER | Age: 75
End: 2025-03-25
Attending: STUDENT IN AN ORGANIZED HEALTH CARE EDUCATION/TRAINING PROGRAM
Payer: MEDICARE

## 2025-03-25 VITALS
RESPIRATION RATE: 16 BRPM | OXYGEN SATURATION: 91 % | TEMPERATURE: 97.1 F | BODY MASS INDEX: 33.57 KG/M2 | HEART RATE: 70 BPM | DIASTOLIC BLOOD PRESSURE: 50 MMHG | SYSTOLIC BLOOD PRESSURE: 100 MMHG | HEIGHT: 66 IN

## 2025-03-25 DIAGNOSIS — I95.9 HYPOTENSION, UNSPECIFIED HYPOTENSION TYPE: ICD-10-CM

## 2025-03-25 DIAGNOSIS — J98.8 VIRAL RESPIRATORY ILLNESS: ICD-10-CM

## 2025-03-25 DIAGNOSIS — R11.0 NAUSEA: ICD-10-CM

## 2025-03-25 DIAGNOSIS — R05.1 ACUTE COUGH: ICD-10-CM

## 2025-03-25 DIAGNOSIS — B97.89 VIRAL RESPIRATORY ILLNESS: ICD-10-CM

## 2025-03-25 PROCEDURE — 3074F SYST BP LT 130 MM HG: CPT | Performed by: PHYSICIAN ASSISTANT

## 2025-03-25 PROCEDURE — 3078F DIAST BP <80 MM HG: CPT | Performed by: PHYSICIAN ASSISTANT

## 2025-03-25 PROCEDURE — 99214 OFFICE O/P EST MOD 30 MIN: CPT | Performed by: PHYSICIAN ASSISTANT

## 2025-03-25 RX ORDER — ONDANSETRON 4 MG/1
4 TABLET, FILM COATED ORAL EVERY 8 HOURS PRN
Qty: 21 TABLET | Refills: 0 | Status: SHIPPED | OUTPATIENT
Start: 2025-03-25 | End: 2025-04-01

## 2025-03-25 RX ORDER — BENZONATATE 100 MG/1
100 CAPSULE ORAL 3 TIMES DAILY PRN
Qty: 21 CAPSULE | Refills: 0 | Status: SHIPPED | OUTPATIENT
Start: 2025-03-25 | End: 2025-04-01

## 2025-03-25 NOTE — PROGRESS NOTES
Subjective:     CC: Feeling ill    HPI:   Lisa here today with   Verbal consent was acquired by the patient to use [employer] ambient listening note generation during this visit Yes     History of Present Illness  The patient presents for evaluation of a cold and fainting spells. She is accompanied by her daughter.    She began experiencing symptoms of a cold, including coughing and nasal congestion, on Wednesday following a recent cruise to [place]. Her daughter also contracted the cold but has since improved, although she now has an ear infection. The patient reports a decrease in appetite and fatigue but has not experienced any fevers. She has been experiencing chills and describes a general feeling of unease. She has not been tested for influenza or COVID-19. She reports no sinus congestion, wheezing, shortness of breath, palpitations, or chest pain. She does report new body aches and difficulty maintaining balance.  Denies leg swelling. She has been self-medicating with Coricidin HBP.    Approximately 45 minutes prior to the visit, she experienced a fainting episode in the bathroom. Denies head or neck pain    Supplemental Information  She has a history of multiple sclerosis (MS), which causes difficulty with bowel movements.    SOCIAL HISTORY  She admits to having some alicia last night.    MEDICATIONS  Current: Coricidin HBP, Hyzaar (losartan hydrochlorothiazide)  Past: semaglutide          ROS:  All ROS negative except for pertinent positives listed above.       Current Outpatient Medications Ordered in Epic   Medication Sig Dispense Refill    gabapentin (NEURONTIN) 100 MG Cap Take 3 Capsules by mouth at bedtime. 300 Capsule 2    losartan-hydrochlorothiazide (HYZAAR) 50-12.5 MG per tablet Take 2 Tablets by mouth every day. 200 Tablet 2    omeprazole (PRILOSEC) 20 MG delayed-release capsule Take 1 Capsule by mouth every day. 100 Capsule 2    FLUoxetine (PROZAC) 10 MG Cap Take 2 Capsules by mouth every day.  "100 Capsule 3    ROPINIRole (REQUIP) 1 MG Tab Take 1 Tablet by mouth 4 times a day. 400 Tablet 2    rosuvastatin (CRESTOR) 10 MG Tab Take 1 Tablet by mouth every evening. 100 Tablet 2    levothyroxine (SYNTHROID) 88 MCG Tab Take 1 Tablet by mouth every morning on an empty stomach. 100 Tablet 2    Semaglutide 7 MG Tab Take 1 Tablet by mouth every day.       No current [facility]-ordered facility-administered medications on file.       Health Maintenance: Completed    Objective:     Exam:  /50 (BP Location: Left arm, Patient Position: Sitting, BP Cuff Size: Adult)   Pulse 70   Temp 36.2 °C (97.1 °F) (Temporal)   Resp 16   Ht 1.676 m (5' 6\")   SpO2 91%   BMI 33.57 kg/m²  Body mass index is 33.57 kg/m².    Gen: Alert and oriented, No apparent distress.  Neck: Neck is supple without lymphadenopathy.  Lungs: Normal effort, CTA bilaterally, no wheezes, rhonchi, or rales  CV: Regular rate and rhythm. No murmurs, rubs, or gallops.  Ext: No clubbing, cyanosis, edema.  ABD: Soft, nontender, nondistended, bowel sounds present in all 4 quadrants, Longo sign negative, no palpable masses, no rebound tenderness      Assessment & Plan:     74 y.o. female with the following -     1. Hypotension, unspecified hypotension type  Her blood pressure is low, likely due to dehydration. She is advised to hold off on her blood pressure medication (Hyzaar) for the next 2 to 3 days and monitor her blood pressure at home. She has not taken semaglutide recently and is advised to hold off on it for another week.  Advised patient to increase fluid intake significantly    2. Viral respiratory illness  Symptoms suggest a severe cold exacerbated by dehydration and hypotension, intensifying her overall discomfort. She is advised to increase fluid intake, including diluted juice, Gatorade, or Pedialyte. A prescription for a cough suppressant will be provided and sent to [facility] on [address].    3. Nausea  - ondansetron (ZOFRAN) 4 MG Tab tablet; Take 1 " Tablet by mouth every 8 hours as needed for Nausea/Vomiting for up to 7 days.  Dispense: 21 Tablet; Refill: 0    4. Acute cough  - benzonatate (TESSALON) 100 MG Cap; Take 1 Capsule by mouth 3 times a day as needed for Cough for up to 7 days.  Dispense: 21 Capsule; Refill: 0      I spent a total of 16 minutes with record review, exam, communication with the patient, communication with other providers, and documentation of this encounter.      No follow-ups on file.    Please note that this dictation was created using voice recognition software. I have made every reasonable attempt to correct obvious errors, but there may be errors of grammar and possibly content that I did not discover before finalizing the note.

## 2025-03-27 ENCOUNTER — APPOINTMENT (OUTPATIENT)
Dept: PHYSICAL THERAPY | Facility: MEDICAL CENTER | Age: 75
End: 2025-03-27
Attending: STUDENT IN AN ORGANIZED HEALTH CARE EDUCATION/TRAINING PROGRAM
Payer: MEDICARE

## 2025-04-01 ENCOUNTER — APPOINTMENT (OUTPATIENT)
Dept: PHYSICAL THERAPY | Facility: MEDICAL CENTER | Age: 75
End: 2025-04-01
Attending: STUDENT IN AN ORGANIZED HEALTH CARE EDUCATION/TRAINING PROGRAM
Payer: MEDICARE

## 2025-04-03 ENCOUNTER — APPOINTMENT (OUTPATIENT)
Dept: PHYSICAL THERAPY | Facility: MEDICAL CENTER | Age: 75
End: 2025-04-03
Attending: STUDENT IN AN ORGANIZED HEALTH CARE EDUCATION/TRAINING PROGRAM
Payer: MEDICARE

## 2025-04-08 ENCOUNTER — APPOINTMENT (OUTPATIENT)
Dept: PHYSICAL THERAPY | Facility: MEDICAL CENTER | Age: 75
End: 2025-04-08
Attending: STUDENT IN AN ORGANIZED HEALTH CARE EDUCATION/TRAINING PROGRAM
Payer: MEDICARE

## 2025-04-10 ENCOUNTER — APPOINTMENT (OUTPATIENT)
Dept: PHYSICAL THERAPY | Facility: MEDICAL CENTER | Age: 75
End: 2025-04-10
Attending: STUDENT IN AN ORGANIZED HEALTH CARE EDUCATION/TRAINING PROGRAM
Payer: MEDICARE

## 2025-05-19 ENCOUNTER — OFFICE VISIT (OUTPATIENT)
Dept: MEDICAL GROUP | Age: 75
End: 2025-05-19
Payer: MEDICARE

## 2025-05-19 VITALS
TEMPERATURE: 96.9 F | HEART RATE: 66 BPM | SYSTOLIC BLOOD PRESSURE: 112 MMHG | OXYGEN SATURATION: 97 % | WEIGHT: 210 LBS | BODY MASS INDEX: 33.75 KG/M2 | DIASTOLIC BLOOD PRESSURE: 68 MMHG | HEIGHT: 66 IN

## 2025-05-19 DIAGNOSIS — M25.472 EDEMA OF LEFT ANKLE: ICD-10-CM

## 2025-05-19 DIAGNOSIS — I87.8 VENOUS STASIS OF LOWER EXTREMITY: ICD-10-CM

## 2025-05-19 DIAGNOSIS — M54.2 NECK PAIN, BILATERAL: Primary | ICD-10-CM

## 2025-05-19 PROCEDURE — 3074F SYST BP LT 130 MM HG: CPT | Performed by: STUDENT IN AN ORGANIZED HEALTH CARE EDUCATION/TRAINING PROGRAM

## 2025-05-19 PROCEDURE — 3078F DIAST BP <80 MM HG: CPT | Performed by: STUDENT IN AN ORGANIZED HEALTH CARE EDUCATION/TRAINING PROGRAM

## 2025-05-19 PROCEDURE — 99214 OFFICE O/P EST MOD 30 MIN: CPT | Performed by: STUDENT IN AN ORGANIZED HEALTH CARE EDUCATION/TRAINING PROGRAM

## 2025-05-19 ASSESSMENT — ENCOUNTER SYMPTOMS
WEAKNESS: 0
FEVER: 0
PALPITATIONS: 0
BACK PAIN: 0
HEADACHES: 0
BRUISES/BLEEDS EASILY: 0
COUGH: 0
PHOTOPHOBIA: 0
ABDOMINAL PAIN: 0
DIZZINESS: 0
ORTHOPNEA: 0
SENSORY CHANGE: 0
CHILLS: 0
BLOOD IN STOOL: 0
DOUBLE VISION: 0
DEPRESSION: 0
EYE DISCHARGE: 0
SHORTNESS OF BREATH: 0
WHEEZING: 0
BLURRED VISION: 0

## 2025-05-19 ASSESSMENT — FIBROSIS 4 INDEX: FIB4 SCORE: 1.74

## 2025-05-19 ASSESSMENT — LIFESTYLE VARIABLES: SUBSTANCE_ABUSE: 0

## 2025-05-19 NOTE — PATIENT INSTRUCTIONS
-May take Gabapentin in the morning as well 100 mg and may increase dose as needed every 3 or 5 days  -Start using your compression stockings, low salt diet, elevate legs   -If edema does not improve or worsen we could consider imaging  -Follow up as needed

## 2025-05-19 NOTE — PROGRESS NOTES
Subjective:     CC: Lower extremity edema    HPI:   History of Present Illness  The patient is a 74-year-old female presenting for a follow-up visit.    She reports persistent swelling in her ankle, which she has been experiencing since 05/05/2025. The swelling does not subside overnight, a pattern she recalls from previous episodes when she was significantly heavier. During those times, the use of compression stockings or the passage of night would alleviate the swelling. However, the current episode is characterized by constant swelling, although it is not associated with pain or injury. She also reports numbness in her feet, a sensation that is typically present. She has not recently used compression stockings and has been attempting to manage the swelling by elevating her legs while seated.    Additionally, she experiences discomfort in the posterior aspect of her knee, but there is no evidence of inflammation such as heat.    She has been experiencing discomfort in her neck, back, and down the posterior aspect of her arms. When it gets really bad, it burns and hurts a lot. These areas become sensitive to touch within a day or two, resembling a bruise. There is no associated rash. She initially attributed these symptoms to her multiple sclerosis (MS). She has been using lidocaine patches, which provide minimal relief, and has also been taking Tylenol due to the significant pain. She takes gabapentin at night.    FAMILY HISTORY  Her sister has had DVT several times.       ROS:  Review of Systems   Constitutional:  Negative for chills, fever and malaise/fatigue.   HENT:  Negative for nosebleeds and tinnitus.    Eyes:  Negative for blurred vision, double vision, photophobia and discharge.   Respiratory:  Negative for cough, shortness of breath and wheezing.    Cardiovascular:  Positive for leg swelling. Negative for chest pain, palpitations and orthopnea.   Gastrointestinal:  Negative for abdominal pain, blood in  "stool and melena.   Genitourinary:  Negative for dysuria, frequency and urgency.   Musculoskeletal:  Negative for back pain and joint pain.   Skin:  Negative for rash.   Neurological:  Negative for dizziness, sensory change, weakness and headaches.   Endo/Heme/Allergies:  Does not bruise/bleed easily.   Psychiatric/Behavioral:  Negative for depression, substance abuse and suicidal ideas.        Objective:     Exam:  /68 (BP Location: Left arm, Patient Position: Sitting, BP Cuff Size: Adult)   Pulse 66   Temp 36.1 °C (96.9 °F) (Temporal)   Ht 1.676 m (5' 6\")   Wt 95.3 kg (210 lb)   SpO2 97%   BMI 33.89 kg/m²  Body mass index is 33.89 kg/m².    Physical Exam  Vitals reviewed.   Constitutional:       General: She is not in acute distress.  HENT:      Head: Normocephalic and atraumatic.      Mouth/Throat:      Mouth: Mucous membranes are moist.   Eyes:      General: No scleral icterus.     Extraocular Movements: Extraocular movements intact.      Pupils: Pupils are equal, round, and reactive to light.   Cardiovascular:      Rate and Rhythm: Normal rate and regular rhythm.      Pulses: Normal pulses.      Heart sounds: Normal heart sounds. No murmur heard.  Pulmonary:      Effort: Pulmonary effort is normal. No respiratory distress.      Breath sounds: Normal breath sounds. No wheezing.   Abdominal:      General: There is no distension.      Palpations: Abdomen is soft.      Tenderness: There is no abdominal tenderness. There is no guarding or rebound.   Musculoskeletal:      Cervical back: Normal range of motion and neck supple.      Right lower leg: Edema present.      Left lower leg: Edema present.      Comments: 1+ lower extremity edema left side worse than the right and more pronounced in her left ankle.  No tenderness, no erythema or increase of local temperature.   Skin:     Capillary Refill: Capillary refill takes less than 2 seconds.      Coloration: Skin is not jaundiced.      Findings: No bruising. "   Neurological:      General: No focal deficit present.      Mental Status: She is alert.   Psychiatric:         Mood and Affect: Mood normal.       Labs: Reviewed    Assessment & Plan:     1. Neck pain, bilateral (Primary)  - She reports pain in the neck and back, which she attributes to her MS.  - She is currently using lidocaine patches and taking Tylenol for pain relief.  - She is advised to increase the frequency of gabapentin intake, starting with 100 mg in the morning and gradually increasing the dose.  - The potential side effects of gabapentin, including drowsiness and cognitive impairment, were discussed.    2. Edema of left ankle  3. Venous stasis of lower extremity  - The ankle edema is likely due to venous insufficiency, as there are no signs of redness, warmth, or bruising that would suggest a blood clot.  - No history of trauma or recent traveling, or history of blood clots.  - The absence of pitting edema also indicates that it is not related to cardiac or hepatic issues.  - She is advised to use compression stockings and limit salt intake. Elevating her legs while sitting is recommended.  - If there is no improvement, an ultrasound may be considered for further evaluation.      Return in about 6 months (around 11/19/2025) for Meds.    Please note that this dictation was created using voice recognition software. I have made every reasonable attempt to correct obvious errors, but I expect that there are errors of grammar and possibly content that I did not discover before finalizing the note.

## 2025-06-09 DIAGNOSIS — Z00.6 CLINICAL TRIAL PARTICIPANT: ICD-10-CM

## 2025-06-13 NOTE — TELEPHONE ENCOUNTER
Received request via: Patient    Was the patient seen in the last year in this department? Yes    Does the patient have an active prescription (recently filled or refills available) for medication(s) requested? No    Pharmacy Name: [id number] [place], [place] - [address]     Does the patient have [facility]PC Plus and need 100-day supply? (This applies to ALL medications) Yes, quantity updated to 100 days   Requested Prescriptions     Pending Prescriptions Disp Refills    losartan-hydrochlorothiazide (HYZAAR) 50-12.5 MG per tablet 200 Tablet 2     Sig: Take 2 Tablets by mouth every day.    levothyroxine (SYNTHROID) 88 MCG Tab 100 Tablet 2     Sig: Take 1 Tablet by mouth every morning on an empty stomach.

## 2025-06-14 RX ORDER — LOSARTAN POTASSIUM AND HYDROCHLOROTHIAZIDE 12.5; 5 MG/1; MG/1
2 TABLET ORAL DAILY
Qty: 200 TABLET | Refills: 2 | Status: SHIPPED | OUTPATIENT
Start: 2025-06-14

## 2025-06-14 RX ORDER — LEVOTHYROXINE SODIUM 88 UG/1
88 TABLET ORAL
Qty: 100 TABLET | Refills: 2 | Status: SHIPPED | OUTPATIENT
Start: 2025-06-14

## 2025-06-17 ENCOUNTER — HOSPITAL ENCOUNTER (OUTPATIENT)
Dept: LAB | Facility: MEDICAL CENTER | Age: 75
End: 2025-06-17
Attending: FAMILY MEDICINE
Payer: MEDICARE

## 2025-06-17 DIAGNOSIS — Z00.6 CLINICAL TRIAL PARTICIPANT: ICD-10-CM

## 2025-06-19 DIAGNOSIS — F32.A ANXIETY AND DEPRESSION: ICD-10-CM

## 2025-06-19 DIAGNOSIS — F41.9 ANXIETY AND DEPRESSION: ICD-10-CM

## 2025-06-19 RX ORDER — LOSARTAN POTASSIUM AND HYDROCHLOROTHIAZIDE 12.5; 5 MG/1; MG/1
2 TABLET ORAL DAILY
Qty: 200 TABLET | Refills: 2 | OUTPATIENT
Start: 2025-06-19

## 2025-06-19 RX ORDER — LEVOTHYROXINE SODIUM 88 UG/1
88 TABLET ORAL
Qty: 100 TABLET | Refills: 2 | OUTPATIENT
Start: 2025-06-19

## 2025-06-19 RX ORDER — FLUOXETINE 10 MG/1
20 CAPSULE ORAL DAILY
Qty: 100 CAPSULE | Refills: 3 | OUTPATIENT
Start: 2025-06-19

## 2025-06-19 RX ORDER — GABAPENTIN 100 MG/1
300 CAPSULE ORAL
Qty: 300 CAPSULE | Refills: 2 | OUTPATIENT
Start: 2025-06-19

## 2025-06-19 RX ORDER — ROSUVASTATIN CALCIUM 10 MG/1
10 TABLET, COATED ORAL EVERY EVENING
Qty: 100 TABLET | Refills: 2 | OUTPATIENT
Start: 2025-06-19

## 2025-06-27 LAB
APOB+LDLR+PCSK9 GENE MUT ANL BLD/T: NOT DETECTED
BRCA1+BRCA2 DEL+DUP + FULL MUT ANL BLD/T: NOT DETECTED
MLH1+MSH2+MSH6+PMS2 GN DEL+DUP+FUL M: NOT DETECTED

## 2025-06-30 ENCOUNTER — RESULTS FOLLOW-UP (OUTPATIENT)
Dept: MEDICAL GROUP | Facility: PHYSICIAN GROUP | Age: 75
End: 2025-06-30
Payer: MEDICARE

## 2025-07-18 ENCOUNTER — PATIENT MESSAGE (OUTPATIENT)
Dept: MEDICAL GROUP | Age: 75
End: 2025-07-18
Payer: MEDICARE

## 2025-07-18 DIAGNOSIS — F41.0 PANIC ATTACK: Primary | ICD-10-CM

## 2025-07-18 RX ORDER — LORAZEPAM 0.5 MG/1
0.5 TABLET ORAL PRN
Qty: 10 TABLET | Refills: 0 | Status: SHIPPED | OUTPATIENT
Start: 2025-07-18 | End: 2025-07-31

## 2025-08-18 ENCOUNTER — TELEPHONE (OUTPATIENT)
Dept: MEDICAL GROUP | Age: 75
End: 2025-08-18
Payer: MEDICARE

## 2025-08-19 DIAGNOSIS — F41.0 PANIC ATTACK: Primary | ICD-10-CM

## 2025-08-19 RX ORDER — LORAZEPAM 0.5 MG/1
0.5 TABLET ORAL
Qty: 30 TABLET | Refills: 0 | Status: SHIPPED | OUTPATIENT
Start: 2025-08-19 | End: 2025-11-24

## 2025-08-21 ENCOUNTER — TELEMEDICINE (OUTPATIENT)
Dept: MEDICAL GROUP | Age: 75
End: 2025-08-21
Payer: MEDICARE

## 2025-08-21 VITALS — TEMPERATURE: 97 F | BODY MASS INDEX: 32.12 KG/M2 | WEIGHT: 199 LBS

## 2025-08-21 DIAGNOSIS — R11.0 NAUSEA: ICD-10-CM

## 2025-08-21 DIAGNOSIS — U07.1 COVID-19: Primary | ICD-10-CM

## 2025-08-21 DIAGNOSIS — N18.31 STAGE 3A CHRONIC KIDNEY DISEASE (CKD): ICD-10-CM

## 2025-08-21 DIAGNOSIS — E78.5 DYSLIPIDEMIA: Chronic | ICD-10-CM

## 2025-08-21 PROCEDURE — 99214 OFFICE O/P EST MOD 30 MIN: CPT | Mod: 95 | Performed by: STUDENT IN AN ORGANIZED HEALTH CARE EDUCATION/TRAINING PROGRAM

## 2025-08-21 RX ORDER — ONDANSETRON 4 MG/1
4 TABLET, FILM COATED ORAL EVERY 4 HOURS PRN
Qty: 20 TABLET | Refills: 1 | Status: SHIPPED | OUTPATIENT
Start: 2025-08-21

## 2025-08-21 ASSESSMENT — ENCOUNTER SYMPTOMS
PALPITATIONS: 0
WEAKNESS: 0
DEPRESSION: 0
ORTHOPNEA: 0
BRUISES/BLEEDS EASILY: 0
BLURRED VISION: 0
HEADACHES: 0
SHORTNESS OF BREATH: 0
DIZZINESS: 0
COUGH: 1
CHILLS: 0
SORE THROAT: 1
BLOOD IN STOOL: 0
NAUSEA: 1
PHOTOPHOBIA: 0
ABDOMINAL PAIN: 0
DOUBLE VISION: 0
WHEEZING: 0
SPUTUM PRODUCTION: 0
MYALGIAS: 1
BACK PAIN: 0
FEVER: 0

## 2025-08-21 ASSESSMENT — FIBROSIS 4 INDEX: FIB4 SCORE: 1.76

## 2025-08-21 ASSESSMENT — LIFESTYLE VARIABLES: SUBSTANCE_ABUSE: 0

## 2025-08-22 PROCEDURE — RXMED WILLOW AMBULATORY MEDICATION CHARGE: Performed by: STUDENT IN AN ORGANIZED HEALTH CARE EDUCATION/TRAINING PROGRAM

## 2025-08-25 ENCOUNTER — PHARMACY VISIT (OUTPATIENT)
Dept: PHARMACY | Facility: MEDICAL CENTER | Age: 75
End: 2025-08-25
Payer: COMMERCIAL